# Patient Record
Sex: MALE | Race: WHITE | NOT HISPANIC OR LATINO | Employment: FULL TIME | URBAN - METROPOLITAN AREA
[De-identification: names, ages, dates, MRNs, and addresses within clinical notes are randomized per-mention and may not be internally consistent; named-entity substitution may affect disease eponyms.]

---

## 2017-12-18 ENCOUNTER — APPOINTMENT (OUTPATIENT)
Dept: RADIOLOGY | Facility: IMAGING CENTER | Age: 42
End: 2017-12-18
Attending: PHYSICIAN ASSISTANT
Payer: COMMERCIAL

## 2017-12-18 ENCOUNTER — OFFICE VISIT (OUTPATIENT)
Dept: URGENT CARE | Facility: CLINIC | Age: 42
End: 2017-12-18
Payer: COMMERCIAL

## 2017-12-18 VITALS
DIASTOLIC BLOOD PRESSURE: 78 MMHG | SYSTOLIC BLOOD PRESSURE: 120 MMHG | HEART RATE: 88 BPM | OXYGEN SATURATION: 95 % | HEIGHT: 69 IN | WEIGHT: 192 LBS | TEMPERATURE: 98 F | BODY MASS INDEX: 28.44 KG/M2 | RESPIRATION RATE: 20 BRPM

## 2017-12-18 DIAGNOSIS — R05.9 COUGH: ICD-10-CM

## 2017-12-18 DIAGNOSIS — J11.1 INFLUENZA: ICD-10-CM

## 2017-12-18 PROCEDURE — 71020 DX-CHEST-2 VIEWS: CPT | Mod: 26 | Performed by: PHYSICIAN ASSISTANT

## 2017-12-18 PROCEDURE — 99203 OFFICE O/P NEW LOW 30 MIN: CPT | Performed by: PHYSICIAN ASSISTANT

## 2017-12-18 RX ORDER — OSELTAMIVIR PHOSPHATE 75 MG/1
75 CAPSULE ORAL 2 TIMES DAILY
Qty: 10 CAP | Refills: 0 | Status: SHIPPED | OUTPATIENT
Start: 2017-12-18 | End: 2019-12-26

## 2017-12-18 ASSESSMENT — ENCOUNTER SYMPTOMS
WHEEZING: 0
SPUTUM PRODUCTION: 1
SORE THROAT: 0
HEADACHES: 1
VOMITING: 0
DIARRHEA: 0
COUGH: 1
CHILLS: 0
FOCAL WEAKNESS: 1
SENSORY CHANGE: 0
ABDOMINAL PAIN: 0
FEVER: 1
EYES NEGATIVE: 1
SHORTNESS OF BREATH: 0
SINUS PAIN: 0
MYALGIAS: 1
NAUSEA: 0

## 2017-12-18 NOTE — PROGRESS NOTES
"Subjective:      Neel Casillas is a 42 y.o. male who presents with Fever            Fever    This is a new problem. Episode onset: 2 days ago. The problem occurs constantly. The problem has been unchanged. The maximum temperature noted was 103 to 103.9 F. The temperature was taken using an oral thermometer. Associated symptoms include congestion, coughing, headaches and muscle aches. Pertinent negatives include no abdominal pain, chest pain, diarrhea, nausea, rash, sore throat, vomiting or wheezing. Treatments tried: OTC cold medication. The treatment provided mild relief.   Risk factors: recent sickness and sick contacts    Sick 2 weeks ago with cough/cold symptoms, started to feel better until 2 days ago he developed fever again up to 103.0 degrees with associated body aches, fatigue, cough, congestion, headache.  He did not get his influenza vaccine this year.      Review of Systems   Constitutional: Positive for fever and malaise/fatigue. Negative for chills.   HENT: Positive for congestion. Negative for sinus pain and sore throat.    Eyes: Negative.    Respiratory: Positive for cough and sputum production. Negative for shortness of breath and wheezing.    Cardiovascular: Negative for chest pain.   Gastrointestinal: Negative for abdominal pain, diarrhea, nausea and vomiting.   Musculoskeletal: Positive for myalgias. Negative for joint pain.   Skin: Negative for itching and rash.   Neurological: Positive for focal weakness and headaches. Negative for sensory change.          Objective:     /78   Pulse 88   Temp 36.7 °C (98 °F)   Resp 20   Ht 1.753 m (5' 9\")   Wt 87.1 kg (192 lb)   SpO2 95%   BMI 28.35 kg/m²      Physical Exam   Constitutional: He is oriented to person, place, and time. He appears well-developed and well-nourished.   HENT:   Head: Normocephalic and atraumatic.   Right Ear: External ear normal.   Left Ear: External ear normal.   Mouth/Throat: Oropharynx is clear and moist. No " oropharyngeal exudate.   Eyes: Conjunctivae and EOM are normal. Pupils are equal, round, and reactive to light.   Neck: Normal range of motion. Neck supple.   Cardiovascular: Normal rate, regular rhythm and normal heart sounds.    Pulmonary/Chest: Effort normal. He has wheezes. He has rales.   Left base wheezing/rhonchi   Musculoskeletal: Normal range of motion.   Lymphadenopathy:     He has no cervical adenopathy.   Neurological: He is alert and oriented to person, place, and time.   Skin: Skin is warm and dry.   Psychiatric: He has a normal mood and affect. His behavior is normal. Judgment and thought content normal.   Nursing note and vitals reviewed.         Chest XR:    FINDINGS:  The heart is normal in size.  No pulmonary infiltrates or consolidations are noted.  No pleural effusions are appreciated.     Impression       No pulmonary consolidation identified.          Assessment/Plan:     1. Influenza  - oseltamivir (TAMIFLU) 75 MG Cap; Take 1 Cap by mouth 2 times a day.  Dispense: 10 Cap; Refill: 0    2. Cough  - DX-CHEST-2 VIEWS; Future    Chest XR without evidence of infection.  Tamiflu as directed for influenza, rest, fluids, tylenol/motrin for discomfort.  Follow-up if symptoms change, get worse or new symptoms develop.

## 2018-03-22 ENCOUNTER — HOSPITAL ENCOUNTER (OUTPATIENT)
Dept: HOSPITAL 8 - CFH | Age: 43
Discharge: HOME | End: 2018-03-22
Attending: FAMILY MEDICINE
Payer: COMMERCIAL
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
## 2018-03-22 DIAGNOSIS — S22.080S: ICD-10-CM
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
## 2018-03-22 DIAGNOSIS — Z13.820: Primary | ICD-10-CM

## 2018-03-22 PROCEDURE — 77080 DXA BONE DENSITY AXIAL: CPT

## 2018-08-29 ENCOUNTER — HOSPITAL ENCOUNTER (OUTPATIENT)
Dept: HOSPITAL 8 - CFH | Age: 43
Discharge: HOME | End: 2018-08-29
Attending: INTERNAL MEDICINE
Payer: COMMERCIAL

## 2018-08-29 DIAGNOSIS — K29.70: ICD-10-CM

## 2018-08-29 DIAGNOSIS — R19.4: ICD-10-CM

## 2018-08-29 DIAGNOSIS — M43.06: ICD-10-CM

## 2018-08-29 DIAGNOSIS — K76.0: Primary | ICD-10-CM

## 2018-08-29 DIAGNOSIS — K80.20: ICD-10-CM

## 2018-08-29 PROCEDURE — 82565 ASSAY OF CREATININE: CPT

## 2018-08-29 PROCEDURE — 74177 CT ABD & PELVIS W/CONTRAST: CPT

## 2019-09-25 ENCOUNTER — TELEPHONE (OUTPATIENT)
Dept: SCHEDULING | Facility: IMAGING CENTER | Age: 44
End: 2019-09-25

## 2019-10-28 ENCOUNTER — APPOINTMENT (OUTPATIENT)
Dept: LAB | Facility: MEDICAL CENTER | Age: 44
End: 2019-10-28
Payer: COMMERCIAL

## 2019-10-29 ENCOUNTER — HOSPITAL ENCOUNTER (OUTPATIENT)
Dept: LAB | Facility: MEDICAL CENTER | Age: 44
End: 2019-10-29
Attending: INTERNAL MEDICINE
Payer: COMMERCIAL

## 2019-10-29 LAB
ALBUMIN SERPL BCP-MCNC: 4.2 G/DL (ref 3.2–4.9)
ALBUMIN/GLOB SERPL: 1.4 G/DL
ALP SERPL-CCNC: 50 U/L (ref 30–99)
ALT SERPL-CCNC: 11 U/L (ref 2–50)
ANION GAP SERPL CALC-SCNC: 6 MMOL/L (ref 0–11.9)
AST SERPL-CCNC: 21 U/L (ref 12–45)
BILIRUB SERPL-MCNC: 0.5 MG/DL (ref 0.1–1.5)
BUN SERPL-MCNC: 24 MG/DL (ref 8–22)
CALCIUM SERPL-MCNC: 9 MG/DL (ref 8.5–10.5)
CHLORIDE SERPL-SCNC: 104 MMOL/L (ref 96–112)
CHOLEST SERPL-MCNC: 237 MG/DL (ref 100–199)
CO2 SERPL-SCNC: 27 MMOL/L (ref 20–33)
CREAT SERPL-MCNC: 1.11 MG/DL (ref 0.5–1.4)
CREAT UR-MCNC: 146.1 MG/DL
FASTING STATUS PATIENT QL REPORTED: NORMAL
GLOBULIN SER CALC-MCNC: 3.1 G/DL (ref 1.9–3.5)
GLUCOSE SERPL-MCNC: 166 MG/DL (ref 65–99)
HDLC SERPL-MCNC: 77 MG/DL
LDLC SERPL CALC-MCNC: 145 MG/DL
MICROALBUMIN UR-MCNC: <0.7 MG/DL
MICROALBUMIN/CREAT UR: NORMAL MG/G (ref 0–30)
POTASSIUM SERPL-SCNC: 4.5 MMOL/L (ref 3.6–5.5)
PROT SERPL-MCNC: 7.3 G/DL (ref 6–8.2)
SODIUM SERPL-SCNC: 137 MMOL/L (ref 135–145)
TRIGL SERPL-MCNC: 76 MG/DL (ref 0–149)

## 2019-10-29 PROCEDURE — 80053 COMPREHEN METABOLIC PANEL: CPT

## 2019-10-29 PROCEDURE — 80061 LIPID PANEL: CPT

## 2019-10-29 PROCEDURE — 36415 COLL VENOUS BLD VENIPUNCTURE: CPT

## 2019-10-29 PROCEDURE — 82043 UR ALBUMIN QUANTITATIVE: CPT

## 2019-10-29 PROCEDURE — 82570 ASSAY OF URINE CREATININE: CPT

## 2019-10-29 PROCEDURE — 83036 HEMOGLOBIN GLYCOSYLATED A1C: CPT

## 2019-10-30 LAB
EST. AVERAGE GLUCOSE BLD GHB EST-MCNC: 157 MG/DL
HBA1C MFR BLD: 7.1 % (ref 0–5.6)

## 2019-12-26 ENCOUNTER — OFFICE VISIT (OUTPATIENT)
Dept: MEDICAL GROUP | Facility: MEDICAL CENTER | Age: 44
End: 2019-12-26
Payer: COMMERCIAL

## 2019-12-26 VITALS
OXYGEN SATURATION: 98 % | BODY MASS INDEX: 24.88 KG/M2 | HEART RATE: 70 BPM | TEMPERATURE: 97.6 F | HEIGHT: 69 IN | RESPIRATION RATE: 16 BRPM | DIASTOLIC BLOOD PRESSURE: 62 MMHG | SYSTOLIC BLOOD PRESSURE: 112 MMHG | WEIGHT: 168 LBS

## 2019-12-26 DIAGNOSIS — E11.3299 DIABETES MELLITUS WITH BACKGROUND RETINOPATHY (HCC): ICD-10-CM

## 2019-12-26 DIAGNOSIS — Z23 NEED FOR VACCINATION: ICD-10-CM

## 2019-12-26 DIAGNOSIS — N52.9 ERECTILE DYSFUNCTION, UNSPECIFIED ERECTILE DYSFUNCTION TYPE: ICD-10-CM

## 2019-12-26 DIAGNOSIS — N52.1 ERECTILE DISORDER DUE TO MEDICAL CONDITION IN MALE PATIENT: ICD-10-CM

## 2019-12-26 DIAGNOSIS — E10.69 DYSLIPIDEMIA DUE TO TYPE 1 DIABETES MELLITUS (HCC): ICD-10-CM

## 2019-12-26 DIAGNOSIS — I10 ESSENTIAL HYPERTENSION: ICD-10-CM

## 2019-12-26 DIAGNOSIS — Z00.00 ANNUAL PHYSICAL EXAM: ICD-10-CM

## 2019-12-26 DIAGNOSIS — E78.5 DYSLIPIDEMIA DUE TO TYPE 1 DIABETES MELLITUS (HCC): ICD-10-CM

## 2019-12-26 DIAGNOSIS — E10.3553 CONTROLLED TYPE 1 DIABETES MELLITUS WITH STABLE PROLIFERATIVE RETINOPATHY OF BOTH EYES (HCC): ICD-10-CM

## 2019-12-26 PROCEDURE — 90471 IMMUNIZATION ADMIN: CPT | Performed by: NURSE PRACTITIONER

## 2019-12-26 PROCEDURE — 99396 PREV VISIT EST AGE 40-64: CPT | Mod: 25 | Performed by: NURSE PRACTITIONER

## 2019-12-26 PROCEDURE — 90686 IIV4 VACC NO PRSV 0.5 ML IM: CPT | Performed by: NURSE PRACTITIONER

## 2019-12-26 RX ORDER — LEVOTHYROXINE SODIUM 0.15 MG/1
137 TABLET ORAL
COMMUNITY
End: 2022-10-21

## 2019-12-26 RX ORDER — SILDENAFIL CITRATE 20 MG/1
60-100 TABLET ORAL
Qty: 90 TAB | Refills: 3 | Status: SHIPPED | OUTPATIENT
Start: 2019-12-26 | End: 2023-01-04 | Stop reason: SDUPTHER

## 2019-12-26 ASSESSMENT — PATIENT HEALTH QUESTIONNAIRE - PHQ9: CLINICAL INTERPRETATION OF PHQ2 SCORE: 0

## 2019-12-26 NOTE — ASSESSMENT & PLAN NOTE
Diagnosed around 11 years old  Followed by endocrinology, Dr. Dotson. Last a1c 6.8  He has a continuous glucose monitor and insulin pump in place.  Following a healthy diet, exercising at least 5 days weekly.

## 2019-12-26 NOTE — PROGRESS NOTES
Chief Complaint   Patient presents with   • Establish Care   • Medication Refill     DIOGOELLIE     Neel Casillas is a 44 y.o. male here to establish care and for well exam.  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  No children.  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 Dotson. Last a1c 6.8  He has a continuous glucose monitor and insulin pump in place.  Following a healthy diet, exercising at least 5 days weekly.    Other postablative hypothyroidism  History of Graves' disease and ablation many years ago, now stable on liothyronine and levothyroxine.  Monitored by Dr. Dotson    Essential hypertension  Bp controlled on low dose of lisinopril.  He is exercising regularly.  No chest pain, dizziness, palpitation    Diabetes mellitus with background retinopathy  Patient reports consistent follow-up with ophthalmology, history of multiple eye surgeries    Erectile disorder due to medical condition in male patient  Patient reports that he has used sildenafil with good results and would like refill of this today.  No history of side effects related to medication use    Dyslipidemia due to type 1 diabetes mellitus (HCC)  Last lipid panel reviewed, .  He has not been prescribed a statin to date, reports that he and Dr. Dotson had planned to evaluate his next lipid panel before making treatment decision.  He is following a healthy diet    Current medicines (including changes today)  Current Outpatient Medications   Medication Sig Dispense Refill   • levothyroxine (SYNTHROID) 150 MCG Tab Take 150 mcg by mouth Every morning on an empty stomach.     • sildenafil (REVATIO) 20 MG tablet Take 3-5 Tabs by mouth 1 time daily as needed. 90 Tab 3   • lisinopril (PRINIVIL) 5 MG Tab TAKE 1 TAB BY MOUTH EVERY DAY. 90 Tab 3   • NOVOLOG 100 UNIT/ML SOLN USE AS DIRECTED PER MD NOT TO EXCEED 50 UNITS DAILY  "AS DIRECTED NEED APPOINTMENT FOR FURTHER REFILLS 20 mL 11   • liothyronine (CYTOMEL) 5 MCG TABS Take 10 mcg by mouth every day.       No current facility-administered medications for this visit.      He  has a past medical history of Diabetes, Unspecified disorder of thyroid, and Unspecified essential hypertension (6/17/2015). He also has no past medical history of ASTHMA.  He  has no past surgical history on file.  Social History     Tobacco Use   • Smoking status: Never Smoker   • Smokeless tobacco: Current User     Types: Snuff   Substance Use Topics   • Alcohol use: Yes     Comment: 4 per week   • Drug use: Never     Social History     Patient does not qualify to have social determinant information on file (likely too young).   Social History Narrative   • Not on file     Family History   Problem Relation Age of Onset   • Hypertension Father      Family Status   Relation Name Status   • Mo  Alive   • Fa  Alive         ROS  Problems listed discussed above, all other systems reviewed and negative     Objective:     /62 (BP Location: Left arm, Patient Position: Sitting, BP Cuff Size: Adult)   Pulse 70   Temp 36.4 °C (97.6 °F) (Temporal)   Resp 16   Ht 1.753 m (5' 9\")   Wt 76.2 kg (168 lb)   SpO2 98%  Body mass index is 24.81 kg/m².  Physical Exam:  General: Alert, oriented in no acute distress.  Eye contact is good, speech is normal, affect calm  HEENT: Oral mucosa pink moist, no lesions. Nares patent. TMs gray with good landmarks bilaterally. No cervical or supraclavicular lymphadenopathy, thyroid isthmus palpable without masses or nodules.  Lungs: clear to auscultation bilaterally, good aeration, normal effort. No wheeze/ rhonchi/ rales.  CV: regular rate and rhythm, S1, S2. No murmur, no JVD, no edema.  Pedal pulses 2 + bilaterally  Abdomen: soft, nontender, BS x4, no hepatosplenomegaly.  Ext: color normal, vascularity normal, temperature normal. No rash or lesions.  Neuro: DTR 2+ " bilaterally  Assessment and Plan:   The following treatment plan was discussed   1. Annual physical exam  Normal physical exam. General health and wellness discussion including healthy diet, regular exercise.  Advised regular dental cleanings, eye exam yearly.   2. Controlled type 1 diabetes mellitus with stable proliferative retinopathy of both eyes (HCC)   stable and doing well, followed by endocrinology   3. Essential hypertension   blood pressure controlled, continue lisinopril        4. Need for vaccination  I have placed the below orders and discussed them with an approved delegating provider. The MA is performing the below orders under the direction of Dr. Lam  Influenza Vaccine Quad Injection (PF)   5. Diabetes mellitus with background retinopathy (HCC)   continue close follow-up with ophthalmology   6. Erectile disorder due to medical condition in male patient   good results with sildenafil, refill provided.  Risks and side effects reviewed, advised caution with alcohol use  sildenafil (REVATIO) 20 MG tablet   7. Dyslipidemia due to type 1 diabetes mellitus (HCC)   lipid panel reviewed, benefits of statins discussed.  He will be following up with Dr. Dotson regarding this       Educated in proper administration of medication(s) ordered today including safety, possible SE, risks, benefits, rationale and alternatives to therapy.     Followup: Annually, sooner as needed             Please note that this dictation was created using voice recognition software. I have worked with consultants from the vendor as well as technical experts from [employer] to optimize the interface. I have made every reasonable attempt to correct obvious errors, but I expect that there are errors of grammar and possibly content that I did not discover before finalizing the note.

## 2019-12-26 NOTE — ASSESSMENT & PLAN NOTE
Last lipid panel reviewed, .  He has not been prescribed a statin to date, reports that he and Dr. Dotson had planned to evaluate his next lipid panel before making treatment decision.  He is following a healthy diet

## 2019-12-26 NOTE — ASSESSMENT & PLAN NOTE
Patient reports that he has used sildenafil with good results and would like refill of this today.  No history of side effects related to medication use

## 2019-12-26 NOTE — ASSESSMENT & PLAN NOTE
History of Graves' disease and ablation many years ago, now stable on liothyronine and levothyroxine.  Monitored by Dr. Dotson

## 2019-12-26 NOTE — ASSESSMENT & PLAN NOTE
Bp controlled on low dose of lisinopril.  He is exercising regularly.  No chest pain, dizziness, palpitation

## 2020-01-27 ENCOUNTER — PATIENT MESSAGE (OUTPATIENT)
Dept: MEDICAL GROUP | Facility: MEDICAL CENTER | Age: 45
End: 2020-01-27

## 2020-01-27 RX ORDER — POLYMYXIN B SULFATE AND TRIMETHOPRIM 1; 10000 MG/ML; [USP'U]/ML
1 SOLUTION OPHTHALMIC EVERY 4 HOURS
Qty: 10 ML | Refills: 0 | Status: SHIPPED | OUTPATIENT
Start: 2020-01-27

## 2020-02-26 ENCOUNTER — HOSPITAL ENCOUNTER (OUTPATIENT)
Dept: LAB | Facility: MEDICAL CENTER | Age: 45
End: 2020-02-26
Attending: INTERNAL MEDICINE
Payer: COMMERCIAL

## 2020-02-26 LAB
ALBUMIN SERPL BCP-MCNC: 4.3 G/DL (ref 3.2–4.9)
ALBUMIN/GLOB SERPL: 1.5 G/DL
ALP SERPL-CCNC: 45 U/L (ref 30–99)
ALT SERPL-CCNC: 9 U/L (ref 2–50)
ANION GAP SERPL CALC-SCNC: 6 MMOL/L (ref 0–11.9)
AST SERPL-CCNC: 20 U/L (ref 12–45)
BILIRUB SERPL-MCNC: 0.6 MG/DL (ref 0.1–1.5)
BUN SERPL-MCNC: 18 MG/DL (ref 8–22)
CALCIUM SERPL-MCNC: 9.2 MG/DL (ref 8.5–10.5)
CHLORIDE SERPL-SCNC: 104 MMOL/L (ref 96–112)
CHOLEST SERPL-MCNC: 177 MG/DL (ref 100–199)
CO2 SERPL-SCNC: 28 MMOL/L (ref 20–33)
CREAT SERPL-MCNC: 1.18 MG/DL (ref 0.5–1.4)
CREAT UR-MCNC: 209.9 MG/DL
EST. AVERAGE GLUCOSE BLD GHB EST-MCNC: 146 MG/DL
FASTING STATUS PATIENT QL REPORTED: NORMAL
GLOBULIN SER CALC-MCNC: 2.9 G/DL (ref 1.9–3.5)
GLUCOSE SERPL-MCNC: 154 MG/DL (ref 65–99)
HBA1C MFR BLD: 6.7 % (ref 0–5.6)
HDLC SERPL-MCNC: 76 MG/DL
LDLC SERPL CALC-MCNC: 92 MG/DL
MICROALBUMIN UR-MCNC: <0.7 MG/DL
MICROALBUMIN/CREAT UR: NORMAL MG/G (ref 0–30)
POTASSIUM SERPL-SCNC: 4.1 MMOL/L (ref 3.6–5.5)
PROT SERPL-MCNC: 7.2 G/DL (ref 6–8.2)
SODIUM SERPL-SCNC: 138 MMOL/L (ref 135–145)
TRIGL SERPL-MCNC: 47 MG/DL (ref 0–149)

## 2020-02-26 PROCEDURE — 36415 COLL VENOUS BLD VENIPUNCTURE: CPT

## 2020-02-26 PROCEDURE — 80061 LIPID PANEL: CPT

## 2020-02-26 PROCEDURE — 80053 COMPREHEN METABOLIC PANEL: CPT

## 2020-02-26 PROCEDURE — 82570 ASSAY OF URINE CREATININE: CPT

## 2020-02-26 PROCEDURE — 82043 UR ALBUMIN QUANTITATIVE: CPT

## 2020-02-26 PROCEDURE — 83036 HEMOGLOBIN GLYCOSYLATED A1C: CPT

## 2020-11-02 ENCOUNTER — PATIENT MESSAGE (OUTPATIENT)
Dept: MEDICAL GROUP | Facility: MEDICAL CENTER | Age: 45
End: 2020-11-02

## 2020-11-02 DIAGNOSIS — E11.3299 DIABETES MELLITUS WITH BACKGROUND RETINOPATHY (HCC): ICD-10-CM

## 2020-11-05 ENCOUNTER — OFFICE VISIT (OUTPATIENT)
Dept: MEDICAL GROUP | Facility: MEDICAL CENTER | Age: 45
End: 2020-11-05
Payer: COMMERCIAL

## 2020-11-05 ENCOUNTER — HOSPITAL ENCOUNTER (OUTPATIENT)
Dept: LAB | Facility: MEDICAL CENTER | Age: 45
End: 2020-11-05
Attending: INTERNAL MEDICINE
Payer: COMMERCIAL

## 2020-11-05 ENCOUNTER — PATIENT MESSAGE (OUTPATIENT)
Dept: MEDICAL GROUP | Facility: MEDICAL CENTER | Age: 45
End: 2020-11-05

## 2020-11-05 VITALS
HEART RATE: 75 BPM | DIASTOLIC BLOOD PRESSURE: 82 MMHG | HEIGHT: 68 IN | TEMPERATURE: 98.1 F | WEIGHT: 185.19 LBS | OXYGEN SATURATION: 96 % | SYSTOLIC BLOOD PRESSURE: 128 MMHG | BODY MASS INDEX: 28.07 KG/M2 | RESPIRATION RATE: 15 BRPM

## 2020-11-05 DIAGNOSIS — Z00.00 ANNUAL PHYSICAL EXAM: ICD-10-CM

## 2020-11-05 DIAGNOSIS — E11.3299 DIABETES MELLITUS WITH BACKGROUND RETINOPATHY (HCC): ICD-10-CM

## 2020-11-05 DIAGNOSIS — E10.311: ICD-10-CM

## 2020-11-05 DIAGNOSIS — Z23 NEED FOR VACCINATION: ICD-10-CM

## 2020-11-05 LAB
T4 FREE SERPL-MCNC: 1.58 NG/DL (ref 0.93–1.7)
TSH SERPL DL<=0.005 MIU/L-ACNC: 0.04 UIU/ML (ref 0.38–5.33)

## 2020-11-05 PROCEDURE — 90471 IMMUNIZATION ADMIN: CPT | Performed by: NURSE PRACTITIONER

## 2020-11-05 PROCEDURE — 84439 ASSAY OF FREE THYROXINE: CPT

## 2020-11-05 PROCEDURE — 36415 COLL VENOUS BLD VENIPUNCTURE: CPT

## 2020-11-05 PROCEDURE — 90686 IIV4 VACC NO PRSV 0.5 ML IM: CPT | Performed by: NURSE PRACTITIONER

## 2020-11-05 PROCEDURE — 84443 ASSAY THYROID STIM HORMONE: CPT

## 2020-11-05 PROCEDURE — 99396 PREV VISIT EST AGE 40-64: CPT | Mod: 25 | Performed by: NURSE PRACTITIONER

## 2020-11-05 PROCEDURE — 83036 HEMOGLOBIN GLYCOSYLATED A1C: CPT

## 2020-11-05 NOTE — TELEPHONE ENCOUNTER
From: Neel Casillas  To: NENA Guzman  Sent: 11/5/2020 2:32 PM PST  Subject: Test Result Question    Yes please. Thank you.       ----- Message -----   From:NENA Guzman   Sent:11/5/2020 1:49 PM PST   To:Neel Casillas   Subject:RE: Test Result Question    I would recommend adding on a lipid panel and comprehensive metabolic panel. That would mean that you have to fast, do you want me to enter that in the system?      ----- Message -----   From:Neel Casillas   Sent:11/5/2020 11:24 AM PST   To:NENA Guzman   Subject:Test Result Question    Hi,  So I just see the endo things on here.   If you think I should get more tests please let me know.     And fabulous seeing you today.     Thanks     Isaiah

## 2020-11-05 NOTE — ASSESSMENT & PLAN NOTE
Well controlled with last A1c of 6.7, followed by endocrinology.  He will be having labs again today

## 2020-11-05 NOTE — PROGRESS NOTES
Chief Complaint   Patient presents with   • Flu Vaccine   • Medication Refill     Neel Casillas is a 45 y.o. male here for annual exam    Diabetes mellitus with background retinopathy  Continues to be monitored by oph, appt today. Having more difficulty with the L eye currently, cloudy vision    Other postablative hypothyroidism  Feeling well on levothyroxine 150 mcg and cytomel 5 mcg daily.  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  He will be having labs again today    Current medicines (including changes today)  Current Outpatient Medications   Medication Sig Dispense Refill   • polymixin-trimethoprim (POLYTRIM) 70442-5.1 UNIT/ML-% Solution Place 1 Drop in both eyes every 4 hours. 10 mL 0   • levothyroxine (SYNTHROID) 150 MCG Tab Take 150 mcg by mouth Every morning on an empty stomach.     • sildenafil (REVATIO) 20 MG tablet Take 3-5 Tabs by mouth 1 time daily as needed. 90 Tab 3   • lisinopril (PRINIVIL) 5 MG Tab TAKE 1 TAB BY MOUTH EVERY DAY. 90 Tab 3   • NOVOLOG 100 UNIT/ML SOLN USE AS DIRECTED PER MD NOT TO EXCEED 50 UNITS DAILY AS DIRECTED NEED APPOINTMENT FOR FURTHER REFILLS 20 mL 11   • liothyronine (CYTOMEL) 5 MCG TABS Take 10 mcg by mouth every day.       No current facility-administered medications for this visit.      He  has a past medical history of Diabetes, Unspecified disorder of thyroid, and Unspecified essential hypertension (6/17/2015). He also has no past medical history of ASTHMA.  He  has no past surgical history on file.  Social History     Tobacco Use   • Smoking status: Never Smoker   • Smokeless tobacco: Current User     Types: Snuff   Substance Use Topics   • Alcohol use: Yes     Comment: 4 per week   • Drug use: Never     Social History     Social History Narrative   • Not on file     Family History   Problem Relation Age of Onset   • Hypertension Father   "    Family Status   Relation Name Status   • Mo  Alive   • Fa  Alive         ROS  Problems listed discussed above, all other systems reviewed and negative     Objective:     /82 (BP Location: Left arm, Patient Position: Sitting, BP Cuff Size: Adult)   Pulse 75   Temp 36.7 °C (98.1 °F) (Temporal)   Resp 15   Ht 1.727 m (5' 8\")   Wt 84 kg (185 lb 3 oz)   SpO2 96%  Body mass index is 28.16 kg/m².  Physical Exam:  General: Alert, oriented in no acute distress.  Eye contact is good, speech is normal, affect calm  HEENT: TMs gray with good landmarks bilaterally. No cervical or supraclavicular lymphadenopathy, thyroid isthmus palpable without masses or nodules.  Lungs: clear to auscultation bilaterally, good aeration, normal effort. No wheeze/ rhonchi/ rales.  CV: regular rate and rhythm, S1, S2. No murmur, no JVD, no edema. Pedal pulses 2 + bilaterally  Abdomen: soft, nontender, BS x4, no hepatosplenomegaly.  Ext: color normal, vascularity normal, temperature normal. No rash or lesions.    Assessment and Plan:   The following treatment plan was discussed  1. Annual physical exam  Normal physical exam. General health and wellness discussion including healthy diet, regular exercise. 2000 iu Vit d3 advised daily. Preventative health screenings up to date. Advised regular dental cleanings, eye exam yearly.     2. Diabetes mellitus with background retinopathy (HCC)   followed by ophthalmology, more difficulty with vision in the left eye at this time.  He has an appointment later today.  I will follow along with consultation notes   3. Need for vaccination  I have placed the below orders and discussed them with an approved delegating provider. The MA is performing the below orders under the direction of Dr. Flores  Influenza Vaccine Quad Injection (PF)   4. Controlled type 1 diabetes mellitus with left eye affected by retinopathy and macular edema, unspecified retinopathy severity (HCC)   controlled, followed by " endocrinology.  Lab orders have been taken care of by his specialist         Followup: annually             Please note that this dictation was created using voice recognition software. I have worked with consultants from the vendor as well as technical experts from [facility] [employer] to optimize the interface. I have made every reasonable attempt to correct obvious errors, but I expect that there are errors of grammar and possibly content that I did not discover before finalizing the note.

## 2020-11-06 LAB
EST. AVERAGE GLUCOSE BLD GHB EST-MCNC: 143 MG/DL
HBA1C MFR BLD: 6.6 % (ref 0–5.6)

## 2021-03-31 ENCOUNTER — IMMUNIZATION (OUTPATIENT)
Dept: FAMILY PLANNING/WOMEN'S HEALTH CLINIC | Facility: IMMUNIZATION CENTER | Age: 46
End: 2021-03-31
Payer: COMMERCIAL

## 2021-03-31 DIAGNOSIS — Z23 ENCOUNTER FOR VACCINATION: Primary | ICD-10-CM

## 2021-03-31 PROCEDURE — 91300 PFIZER SARS-COV-2 VACCINE: CPT

## 2021-03-31 PROCEDURE — 0001A PFIZER SARS-COV-2 VACCINE: CPT

## 2021-04-23 ENCOUNTER — IMMUNIZATION (OUTPATIENT)
Dept: FAMILY PLANNING/WOMEN'S HEALTH CLINIC | Facility: IMMUNIZATION CENTER | Age: 46
End: 2021-04-23
Payer: COMMERCIAL

## 2021-04-23 DIAGNOSIS — Z23 ENCOUNTER FOR VACCINATION: Primary | ICD-10-CM

## 2021-04-23 PROCEDURE — 91300 PFIZER SARS-COV-2 VACCINE: CPT

## 2021-04-23 PROCEDURE — 0002A PFIZER SARS-COV-2 VACCINE: CPT

## 2021-07-29 ENCOUNTER — HOSPITAL ENCOUNTER (OUTPATIENT)
Facility: MEDICAL CENTER | Age: 46
End: 2021-07-29
Attending: NURSE PRACTITIONER
Payer: COMMERCIAL

## 2021-07-29 ENCOUNTER — OFFICE VISIT (OUTPATIENT)
Dept: URGENT CARE | Facility: CLINIC | Age: 46
End: 2021-07-29
Payer: COMMERCIAL

## 2021-07-29 VITALS
HEIGHT: 68 IN | TEMPERATURE: 98.2 F | OXYGEN SATURATION: 99 % | HEART RATE: 72 BPM | RESPIRATION RATE: 16 BRPM | BODY MASS INDEX: 25.01 KG/M2 | DIASTOLIC BLOOD PRESSURE: 54 MMHG | WEIGHT: 165 LBS | SYSTOLIC BLOOD PRESSURE: 120 MMHG

## 2021-07-29 DIAGNOSIS — R10.31 RLQ ABDOMINAL PAIN: ICD-10-CM

## 2021-07-29 LAB
APPEARANCE UR: CLEAR
BILIRUB UR STRIP-MCNC: NEGATIVE MG/DL
COLOR UR AUTO: YELLOW
GLUCOSE UR STRIP.AUTO-MCNC: 100 MG/DL
KETONES UR STRIP.AUTO-MCNC: NEGATIVE MG/DL
LEUKOCYTE ESTERASE UR QL STRIP.AUTO: NEGATIVE
NITRITE UR QL STRIP.AUTO: NEGATIVE
PH UR STRIP.AUTO: 6 [PH] (ref 5–8)
PROT UR QL STRIP: NEGATIVE MG/DL
RBC UR QL AUTO: NEGATIVE
SP GR UR STRIP.AUTO: 1.02
UROBILINOGEN UR STRIP-MCNC: 0.2 MG/DL

## 2021-07-29 PROCEDURE — 80053 COMPREHEN METABOLIC PANEL: CPT

## 2021-07-29 PROCEDURE — 85025 COMPLETE CBC W/AUTO DIFF WBC: CPT

## 2021-07-29 PROCEDURE — 99214 OFFICE O/P EST MOD 30 MIN: CPT | Performed by: NURSE PRACTITIONER

## 2021-07-29 PROCEDURE — 81002 URINALYSIS NONAUTO W/O SCOPE: CPT | Performed by: NURSE PRACTITIONER

## 2021-07-29 ASSESSMENT — ENCOUNTER SYMPTOMS: ABDOMINAL PAIN: 1

## 2021-07-29 NOTE — PROGRESS NOTES
Subjective:      Neel Casillas is a 46 y.o. male who presents with RLQ Pain (today )    Past Medical History:   Diagnosis Date   • Diabetes    • Unspecified disorder of thyroid    • Unspecified essential hypertension 6/17/2015     Social History     Socioeconomic History   • Marital status:      Spouse name: Not on file   • Number of children: Not on file   • Years of education: Not on file   • Highest education level: Not on file   Occupational History   • Not on file   Tobacco Use   • Smoking status: Never Smoker   • Smokeless tobacco: Current User     Types: Snuff   Vaping Use   • Vaping Use: Never used   Substance and Sexual Activity   • Alcohol use: Yes     Comment: 4 per week   • Drug use: Never   • Sexual activity: Not on file   Other Topics Concern   • Not on file   Social History Narrative   • Not on file     Social Determinants of Health     Financial Resource Strain:    • Difficulty of Paying Living Expenses:    Food Insecurity:    • Worried About Running Out of Food in the Last Year:    • Ran Out of Food in the Last Year:    Transportation Needs:    • Lack of Transportation (Medical):    • Lack of Transportation (Non-Medical):    Physical Activity:    • Days of Exercise per Week:    • Minutes of Exercise per Session:    Stress:    • Feeling of Stress :    Social Connections:    • Frequency of Communication with Friends and Family:    • Frequency of Social Gatherings with Friends and Family:    • Attends Baptism Services:    • Active Member of Clubs or Organizations:    • Attends Club or Organization Meetings:    • Marital Status:    Intimate Partner Violence:    • Fear of Current or Ex-Partner:    • Emotionally Abused:    • Physically Abused:    • Sexually Abused:      Family History   Problem Relation Age of Onset   • Hypertension Father        Allergies: Patient has no known allergies.    Patient is 46-year-old male who presents today with complaint of right lower quadrant abdominal  "pain.  Patient states he has been having chronic abdominal pain intermittently for the last 18 months.  Has not discussed this with his primary care physician.  Patient states he experienced an acute episode of right lower quadrant pain earlier today.  He states this is waned but still has some sensitivity in this area.  No nausea or vomiting.  Patient has a history of insulin-dependent diabetes and is currently on an insulin pump.  I do not have any recent labs in the patient's chart.          RLQ Pain  This is a new problem. The current episode started today. The onset quality is sudden. The problem occurs constantly. The pain is mild. Nothing aggravates the pain. The pain is relieved by nothing.       Review of Systems   Gastrointestinal: Positive for abdominal pain.   All other systems reviewed and are negative.         Objective:     /54   Pulse 72   Temp 36.8 °C (98.2 °F) (Temporal)   Resp 16   Ht 1.727 m (5' 8\")   Wt 74.8 kg (165 lb)   SpO2 99%   BMI 25.09 kg/m²      Physical Exam  Vitals reviewed.   Constitutional:       Appearance: Normal appearance.   Abdominal:      General: Abdomen is flat.      Palpations: Abdomen is soft.      Tenderness: There is abdominal tenderness. There is no right CVA tenderness, left CVA tenderness, guarding or rebound.      Comments: There is tenderness over the right lower quadrant; no guarding or rebound tenderness.  Abdomen is soft and nondistended.   Musculoskeletal:         General: Normal range of motion.   Skin:     General: Skin is warm.   Neurological:      General: No focal deficit present.      Mental Status: He is alert and oriented to person, place, and time.   Psychiatric:         Mood and Affect: Mood normal.         Behavior: Behavior normal.         Thought Content: Thought content normal.         Judgment: Judgment normal.       UA: negative blood, negative leukocytes, negative nitrates        I did discuss with patient at length that as his " symptoms seem to be chronic and intermittent that imaging may not indicate what the underlying cause is.  I advised patient that the purpose of doing CT would be to rule out an emergent cause of his symptoms.  Patient verbalized understanding and agreement.  I did also advise patient that because of his history of diabetes that I do need to check renal function prior to ordering a CT and that we would not be able to get the CT done today because of the time currently.  Patient verbalized understanding.  Patient was given option to go to the emergency room and declines and wants to have this done on an outpatient basis.            Assessment/Plan:   Right lower quadrant abdominal pain    Labs ordered including CBC and CMP  CT of the abdomen and pelvis ordered for tomorrow  Strict ER precautions for increasing pain tonight  Will consider referral to GI or back to patient's primary care physician based on outcome of the CT.     There are no diagnoses linked to this encounter.

## 2021-07-30 ENCOUNTER — TELEPHONE (OUTPATIENT)
Dept: URGENT CARE | Facility: CLINIC | Age: 46
End: 2021-07-30

## 2021-07-30 ENCOUNTER — HOSPITAL ENCOUNTER (OUTPATIENT)
Dept: RADIOLOGY | Facility: MEDICAL CENTER | Age: 46
End: 2021-07-30
Attending: NURSE PRACTITIONER
Payer: COMMERCIAL

## 2021-07-30 DIAGNOSIS — R10.31 RLQ ABDOMINAL PAIN: ICD-10-CM

## 2021-07-30 DIAGNOSIS — R10.84 GENERALIZED ABDOMINAL PAIN: ICD-10-CM

## 2021-07-30 LAB
ALBUMIN SERPL BCP-MCNC: 4.2 G/DL (ref 3.2–4.9)
ALBUMIN/GLOB SERPL: 1.6 G/DL
ALP SERPL-CCNC: 50 U/L (ref 30–99)
ALT SERPL-CCNC: <5 U/L (ref 2–50)
ANION GAP SERPL CALC-SCNC: 11 MMOL/L (ref 7–16)
AST SERPL-CCNC: 19 U/L (ref 12–45)
BASOPHILS # BLD AUTO: 0.6 % (ref 0–1.8)
BASOPHILS # BLD: 0.06 K/UL (ref 0–0.12)
BILIRUB SERPL-MCNC: 0.4 MG/DL (ref 0.1–1.5)
BUN SERPL-MCNC: 17 MG/DL (ref 8–22)
CALCIUM SERPL-MCNC: 9 MG/DL (ref 8.5–10.5)
CHLORIDE SERPL-SCNC: 96 MMOL/L (ref 96–112)
CO2 SERPL-SCNC: 26 MMOL/L (ref 20–33)
CREAT SERPL-MCNC: 1.05 MG/DL (ref 0.5–1.4)
EOSINOPHIL # BLD AUTO: 0.09 K/UL (ref 0–0.51)
EOSINOPHIL NFR BLD: 0.9 % (ref 0–6.9)
ERYTHROCYTE [DISTWIDTH] IN BLOOD BY AUTOMATED COUNT: 45.8 FL (ref 35.9–50)
GLOBULIN SER CALC-MCNC: 2.7 G/DL (ref 1.9–3.5)
GLUCOSE SERPL-MCNC: 236 MG/DL (ref 65–99)
HCT VFR BLD AUTO: 43 % (ref 42–52)
HGB BLD-MCNC: 14.1 G/DL (ref 14–18)
IMM GRANULOCYTES # BLD AUTO: 0.04 K/UL (ref 0–0.11)
IMM GRANULOCYTES NFR BLD AUTO: 0.4 % (ref 0–0.9)
LYMPHOCYTES # BLD AUTO: 1.38 K/UL (ref 1–4.8)
LYMPHOCYTES NFR BLD: 13.4 % (ref 22–41)
MCH RBC QN AUTO: 32.3 PG (ref 27–33)
MCHC RBC AUTO-ENTMCNC: 32.8 G/DL (ref 33.7–35.3)
MCV RBC AUTO: 98.4 FL (ref 81.4–97.8)
MONOCYTES # BLD AUTO: 0.79 K/UL (ref 0–0.85)
MONOCYTES NFR BLD AUTO: 7.7 % (ref 0–13.4)
NEUTROPHILS # BLD AUTO: 7.95 K/UL (ref 1.82–7.42)
NEUTROPHILS NFR BLD: 77 % (ref 44–72)
NRBC # BLD AUTO: 0 K/UL
NRBC BLD-RTO: 0 /100 WBC
PLATELET # BLD AUTO: 198 K/UL (ref 164–446)
PMV BLD AUTO: 9.9 FL (ref 9–12.9)
POTASSIUM SERPL-SCNC: 4.7 MMOL/L (ref 3.6–5.5)
PROT SERPL-MCNC: 6.9 G/DL (ref 6–8.2)
RBC # BLD AUTO: 4.37 M/UL (ref 4.7–6.1)
SODIUM SERPL-SCNC: 133 MMOL/L (ref 135–145)
WBC # BLD AUTO: 10.3 K/UL (ref 4.8–10.8)

## 2021-07-30 PROCEDURE — 74177 CT ABD & PELVIS W/CONTRAST: CPT

## 2021-07-30 PROCEDURE — 700117 HCHG RX CONTRAST REV CODE 255: Performed by: NURSE PRACTITIONER

## 2021-07-30 RX ADMIN — IOHEXOL 100 ML: 350 INJECTION, SOLUTION INTRAVENOUS at 10:24

## 2021-07-30 RX ADMIN — IOHEXOL 25 ML: 240 INJECTION, SOLUTION INTRATHECAL; INTRAVASCULAR; INTRAVENOUS; ORAL at 10:45

## 2021-07-30 NOTE — TELEPHONE ENCOUNTER
Patient notified by phone of results of CT and labs.  White blood cell count 10.3.  Liver and kidney function studies are within normal limits.  CT of the abdomen and pelvis does show gallstones, otherwise no other abnormalities were noted.  We will refer patient to gastroenterology for consult at this time, with the understanding that patient will follow up in the ER for acute worsening of symptoms or increasing pain.  Patient verbalized understanding and agreement with plan of care.  No further questions at this time.  Patient also advised to call or return to urgent care otherwise for any further questions or concerns

## 2022-01-08 ENCOUNTER — OFFICE VISIT (OUTPATIENT)
Dept: URGENT CARE | Facility: CLINIC | Age: 47
End: 2022-01-08
Payer: COMMERCIAL

## 2022-01-08 ENCOUNTER — APPOINTMENT (OUTPATIENT)
Dept: RADIOLOGY | Facility: IMAGING CENTER | Age: 47
End: 2022-01-08
Attending: PHYSICIAN ASSISTANT
Payer: COMMERCIAL

## 2022-01-08 VITALS
WEIGHT: 167 LBS | BODY MASS INDEX: 24.73 KG/M2 | SYSTOLIC BLOOD PRESSURE: 116 MMHG | OXYGEN SATURATION: 98 % | DIASTOLIC BLOOD PRESSURE: 64 MMHG | HEART RATE: 88 BPM | RESPIRATION RATE: 18 BRPM | HEIGHT: 69 IN | TEMPERATURE: 98.8 F

## 2022-01-08 DIAGNOSIS — S93.402A SPRAIN OF LEFT ANKLE, UNSPECIFIED LIGAMENT, INITIAL ENCOUNTER: ICD-10-CM

## 2022-01-08 DIAGNOSIS — S99.912A INJURY OF LEFT ANKLE, INITIAL ENCOUNTER: ICD-10-CM

## 2022-01-08 PROCEDURE — 99214 OFFICE O/P EST MOD 30 MIN: CPT | Performed by: PHYSICIAN ASSISTANT

## 2022-01-08 PROCEDURE — 73610 X-RAY EXAM OF ANKLE: CPT | Mod: TC,FY,LT | Performed by: PHYSICIAN ASSISTANT

## 2022-01-08 ASSESSMENT — ENCOUNTER SYMPTOMS
CHILLS: 0
TINGLING: 0
NAUSEA: 0
FALLS: 1
SENSORY CHANGE: 0
VOMITING: 0
FEVER: 0

## 2022-01-08 ASSESSMENT — FIBROSIS 4 INDEX: FIB4 SCORE: 2.08

## 2022-01-09 NOTE — PROGRESS NOTES
Subjective     Neel Casillas is a 46 y.o. male who presents with Ankle Injury (left ankle, skiing injury that happen time.)    HPI:  Neel Casillas is a 46 y.o. male who presents today for evaluation of an injury to his left ankle.  Patient was skiing earlier today when somebody cut in front of them causing him to fall at speed.  He was wearing tall boots at the time but still managed to twist his left ankle when he went down and he has been having pain and swelling in his left ankle since that time.  He denies any previous injury.  No numbness/tingling.  Pain is worse with ambulation.      Review of Systems   Constitutional: Negative for chills and fever.   Gastrointestinal: Negative for nausea and vomiting.   Musculoskeletal: Positive for falls and joint pain (left ankle injury).   Neurological: Negative for tingling and sensory change.       PMH:  has a past medical history of Diabetes, Unspecified disorder of thyroid, and Unspecified essential hypertension (6/17/2015). He also has no past medical history of ASTHMA.  MEDS:   Current Outpatient Medications:   •  polymixin-trimethoprim (POLYTRIM) 00538-6.1 UNIT/ML-% Solution, Place 1 Drop in both eyes every 4 hours., Disp: 10 mL, Rfl: 0  •  levothyroxine (SYNTHROID) 150 MCG Tab, Take 150 mcg by mouth Every morning on an empty stomach., Disp: , Rfl:   •  sildenafil (REVATIO) 20 MG tablet, Take 3-5 Tabs by mouth 1 time daily as needed., Disp: 90 Tab, Rfl: 3  •  lisinopril (PRINIVIL) 5 MG Tab, TAKE 1 TAB BY MOUTH EVERY DAY., Disp: 90 Tab, Rfl: 3  •  NOVOLOG 100 UNIT/ML SOLN, USE AS DIRECTED PER MD NOT TO EXCEED 50 UNITS DAILY AS DIRECTED NEED APPOINTMENT FOR FURTHER REFILLS, Disp: 20 mL, Rfl: 11  •  liothyronine (CYTOMEL) 5 MCG TABS, Take 10 mcg by mouth every day., Disp: , Rfl:   ALLERGIES: No Known Allergies  SURGHX: History reviewed. No pertinent surgical history.  SOCHX:  reports that he has never smoked. His smokeless tobacco use includes snuff. He reports  "current alcohol use. He reports that he does not use drugs.  FH: Family history was reviewed, no pertinent findings to report      Objective     /64 (BP Location: Right arm, Patient Position: Sitting, BP Cuff Size: Adult)   Pulse 88   Temp 37.1 °C (98.8 °F) (Temporal)   Resp 18   Ht 1.74 m (5' 8.5\")   Wt 75.8 kg (167 lb)   SpO2 98%   BMI 25.02 kg/m²      Physical Exam  Constitutional:       Appearance: He is well-developed.   HENT:      Head: Normocephalic and atraumatic.      Right Ear: External ear normal.      Left Ear: External ear normal.   Eyes:      Conjunctiva/sclera: Conjunctivae normal.      Pupils: Pupils are equal, round, and reactive to light.   Cardiovascular:      Rate and Rhythm: Normal rate and regular rhythm.      Heart sounds: Normal heart sounds. No murmur heard.      Pulmonary:      Effort: Pulmonary effort is normal.      Breath sounds: Normal breath sounds. No wheezing.   Musculoskeletal:      Left ankle: Swelling (Most notable on lateral aspect of left ankle) present. No deformity or ecchymosis. Tenderness present over the lateral malleolus and ATF ligament. No medial malleolus, base of 5th metatarsal or proximal fibula tenderness. Decreased range of motion. Anterior drawer test negative. Normal pulse.      Left Achilles Tendon: Normal.      Comments: DP pulses are 2+ and symmetric bilaterally.  Distal neurovascular intact.  Cap refill of all toes is less than 2 seconds.   Skin:     General: Skin is warm and dry.      Capillary Refill: Capillary refill takes less than 2 seconds.   Neurological:      Mental Status: He is alert and oriented to person, place, and time.   Psychiatric:         Behavior: Behavior normal.         Judgment: Judgment normal.       DX-ANKLE 3+ VIEWS LEFT  IMPRESSION:  1.  No evidence of fracture or dislocation.  2.  Mild soft tissue swelling overlying the lateral ankle.      *X-rays were reviewed and interpreted independently by me. I agree with the " radiologist's findings     Assessment & Plan     1. Sprain of left ankle, unspecified ligament, initial encounter  - DX-ANKLE 3+ VIEWS LEFT; Future  -Aircast ankle stirrup was applied and crutches provided.  He was advised to be nonweightbearing for the next 48 hours and then weightbearing as tolerated.  - Apply ice multiple times per day  - OTC NSAIDs  - Keep elevated as much as possible  - Follow up with PCP if no improvement after a few days          My total time spent caring for the patient on the day of the encounter was 30 minutes.   This does not include time spent on separately billable procedures/tests.        Differential Diagnosis, natural history, and supportive care discussed. Return to the Urgent Care or follow up with your PCP if symptoms fail to resolve, or for any new or worsening symptoms. Emergency room precautions discussed. Patient and/or family appears understanding of information.

## 2022-03-10 ENCOUNTER — OFFICE VISIT (OUTPATIENT)
Dept: MEDICAL GROUP | Facility: MEDICAL CENTER | Age: 47
End: 2022-03-10
Payer: COMMERCIAL

## 2022-03-10 VITALS
HEART RATE: 89 BPM | TEMPERATURE: 97 F | OXYGEN SATURATION: 99 % | HEIGHT: 68 IN | BODY MASS INDEX: 25.01 KG/M2 | WEIGHT: 165 LBS | DIASTOLIC BLOOD PRESSURE: 62 MMHG | SYSTOLIC BLOOD PRESSURE: 110 MMHG

## 2022-03-10 DIAGNOSIS — M75.02 ADHESIVE CAPSULITIS OF BOTH SHOULDERS: ICD-10-CM

## 2022-03-10 DIAGNOSIS — M25.512 CHRONIC PAIN OF BOTH SHOULDERS: ICD-10-CM

## 2022-03-10 DIAGNOSIS — Z00.00 HEALTH CARE MAINTENANCE: ICD-10-CM

## 2022-03-10 DIAGNOSIS — M75.01 ADHESIVE CAPSULITIS OF BOTH SHOULDERS: ICD-10-CM

## 2022-03-10 DIAGNOSIS — G89.29 CHRONIC PAIN OF BOTH SHOULDERS: ICD-10-CM

## 2022-03-10 DIAGNOSIS — E10.311: ICD-10-CM

## 2022-03-10 DIAGNOSIS — M25.511 CHRONIC PAIN OF BOTH SHOULDERS: ICD-10-CM

## 2022-03-10 PROBLEM — M75.00 FROZEN SHOULDER: Status: ACTIVE | Noted: 2022-03-10

## 2022-03-10 PROCEDURE — 99204 OFFICE O/P NEW MOD 45 MIN: CPT | Performed by: STUDENT IN AN ORGANIZED HEALTH CARE EDUCATION/TRAINING PROGRAM

## 2022-03-10 ASSESSMENT — ENCOUNTER SYMPTOMS
FEVER: 0
SHORTNESS OF BREATH: 0
CHILLS: 0

## 2022-03-10 ASSESSMENT — FIBROSIS 4 INDEX: FIB4 SCORE: 2.08

## 2022-03-10 ASSESSMENT — PATIENT HEALTH QUESTIONNAIRE - PHQ9: CLINICAL INTERPRETATION OF PHQ2 SCORE: 0

## 2022-03-10 NOTE — PROGRESS NOTES
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 10 mL 0   • levothyroxine (SYNTHROID) 150 MCG Tab Take 150 mcg by mouth Every morning on an empty stomach.     • sildenafil (REVATIO) 20 MG tablet Take 3-5 Tabs by mouth 1 time daily as needed. 90 Tab 3   • lisinopril (PRINIVIL) 5 MG Tab TAKE 1 TAB BY MOUTH EVERY DAY. 90 Tab 3   • NOVOLOG 100 UNIT/ML SOLN USE AS DIRECTED PER MD NOT TO EXCEED 50 UNITS DAILY AS DIRECTED NEED APPOINTMENT FOR FURTHER REFILLS 20 mL 11   • liothyronine (CYTOMEL) 5 MCG TABS Take 10 mcg by mouth every day.       No current [facility]-ordered facility-administered medications on file.           ROS:  Review of Systems   Constitutional: Negative for chills and fever.   Respiratory: Negative for shortness of breath.    Cardiovascular: Negative for chest pain.   Musculoskeletal: Positive for joint pain.       Objective:     Exam:  /62 (BP Location: Left arm, Patient Position: Sitting, BP Cuff Size: Adult)   Pulse 89   Temp 36.1 °C (97 °F) (Temporal)   Ht 1.727 m (5' 8\")   Wt 74.8 kg (165 lb)   SpO2 99%   BMI 25.09 kg/m²  Body mass index is 25.09 kg/m².    Physical Exam  Constitutional:       Appearance: Normal appearance.   Pulmonary:      Effort: Pulmonary effort is normal. "   Musculoskeletal:      Comments: ROM decreased b/l in both shoulders. stiff   Neurological:      Mental Status: He is alert.               Assessment & Plan:     Problem List Items Addressed This Visit     Controlled type 1 diabetes mellitus with ophthalmic complication (HCC)     -Monofilament test completed yesterday by endocrinology           Relevant Orders    Diabetic Monofilament LE Exam (Completed)    Frozen shoulder     Chronic-worsening  -refer to ortho  -PT order placed            Other Visit Diagnoses     Health care maintenance        Relevant Orders    HEMOGLOBIN A1C    CBC WITH DIFFERENTIAL    Comp Metabolic Panel    TSH WITH REFLEX TO FT4    Lipid Profile              Return in about 1 month (around 4/10/2022) for Annual.    Please note that this dictation was created using voice recognition software. I have made every reasonable attempt to correct obvious errors, but I expect that there are errors of grammar and possibly content that I did not discover before finalizing the note.

## 2022-03-11 ENCOUNTER — HOSPITAL ENCOUNTER (OUTPATIENT)
Dept: RADIOLOGY | Facility: MEDICAL CENTER | Age: 47
End: 2022-03-11
Attending: ORTHOPAEDIC SURGERY
Payer: COMMERCIAL

## 2022-03-11 DIAGNOSIS — M25.512 ACUTE PAIN OF LEFT SHOULDER: ICD-10-CM

## 2022-03-11 PROCEDURE — 73030 X-RAY EXAM OF SHOULDER: CPT | Mod: LT

## 2022-04-12 ENCOUNTER — HOSPITAL ENCOUNTER (OUTPATIENT)
Dept: LAB | Facility: MEDICAL CENTER | Age: 47
End: 2022-04-12
Attending: STUDENT IN AN ORGANIZED HEALTH CARE EDUCATION/TRAINING PROGRAM
Payer: COMMERCIAL

## 2022-04-12 ENCOUNTER — APPOINTMENT (OUTPATIENT)
Dept: MEDICAL GROUP | Facility: MEDICAL CENTER | Age: 47
End: 2022-04-12
Payer: COMMERCIAL

## 2022-04-12 DIAGNOSIS — Z00.00 HEALTH CARE MAINTENANCE: ICD-10-CM

## 2022-04-13 ENCOUNTER — HOSPITAL ENCOUNTER (OUTPATIENT)
Dept: LAB | Facility: MEDICAL CENTER | Age: 47
End: 2022-04-13
Attending: STUDENT IN AN ORGANIZED HEALTH CARE EDUCATION/TRAINING PROGRAM
Payer: COMMERCIAL

## 2022-04-13 LAB
ALBUMIN SERPL BCP-MCNC: 4.3 G/DL (ref 3.2–4.9)
ALBUMIN/GLOB SERPL: 1.5 G/DL
ALP SERPL-CCNC: 56 U/L (ref 30–99)
ALT SERPL-CCNC: 6 U/L (ref 2–50)
ANION GAP SERPL CALC-SCNC: 7 MMOL/L (ref 7–16)
AST SERPL-CCNC: 23 U/L (ref 12–45)
BASOPHILS # BLD AUTO: 0.9 % (ref 0–1.8)
BASOPHILS # BLD: 0.06 K/UL (ref 0–0.12)
BILIRUB SERPL-MCNC: 0.4 MG/DL (ref 0.1–1.5)
BUN SERPL-MCNC: 17 MG/DL (ref 8–22)
CALCIUM SERPL-MCNC: 9.3 MG/DL (ref 8.4–10.2)
CHLORIDE SERPL-SCNC: 104 MMOL/L (ref 96–112)
CHOLEST SERPL-MCNC: 203 MG/DL (ref 100–199)
CO2 SERPL-SCNC: 28 MMOL/L (ref 20–33)
CREAT SERPL-MCNC: 0.84 MG/DL (ref 0.5–1.4)
EOSINOPHIL # BLD AUTO: 0.38 K/UL (ref 0–0.51)
EOSINOPHIL NFR BLD: 5.7 % (ref 0–6.9)
ERYTHROCYTE [DISTWIDTH] IN BLOOD BY AUTOMATED COUNT: 40.7 FL (ref 35.9–50)
EST. AVERAGE GLUCOSE BLD GHB EST-MCNC: 143 MG/DL
FASTING STATUS PATIENT QL REPORTED: NORMAL
GFR SERPLBLD CREATININE-BSD FMLA CKD-EPI: 108 ML/MIN/1.73 M 2
GLOBULIN SER CALC-MCNC: 2.9 G/DL (ref 1.9–3.5)
GLUCOSE SERPL-MCNC: 139 MG/DL (ref 65–99)
HBA1C MFR BLD: 6.6 % (ref 4–5.6)
HCT VFR BLD AUTO: 45.9 % (ref 42–52)
HDLC SERPL-MCNC: 96 MG/DL
HGB BLD-MCNC: 15.4 G/DL (ref 14–18)
IMM GRANULOCYTES # BLD AUTO: 0.03 K/UL (ref 0–0.11)
IMM GRANULOCYTES NFR BLD AUTO: 0.5 % (ref 0–0.9)
LDLC SERPL CALC-MCNC: 100 MG/DL
LYMPHOCYTES # BLD AUTO: 2.39 K/UL (ref 1–4.8)
LYMPHOCYTES NFR BLD: 36.1 % (ref 22–41)
MCH RBC QN AUTO: 31 PG (ref 27–33)
MCHC RBC AUTO-ENTMCNC: 33.6 G/DL (ref 33.7–35.3)
MCV RBC AUTO: 92.5 FL (ref 81.4–97.8)
MONOCYTES # BLD AUTO: 0.62 K/UL (ref 0–0.85)
MONOCYTES NFR BLD AUTO: 9.4 % (ref 0–13.4)
NEUTROPHILS # BLD AUTO: 3.14 K/UL (ref 1.82–7.42)
NEUTROPHILS NFR BLD: 47.4 % (ref 44–72)
NRBC # BLD AUTO: 0 K/UL
NRBC BLD-RTO: 0 /100 WBC
PLATELET # BLD AUTO: 218 K/UL (ref 164–446)
PMV BLD AUTO: 9.6 FL (ref 9–12.9)
POTASSIUM SERPL-SCNC: 5.1 MMOL/L (ref 3.6–5.5)
PROT SERPL-MCNC: 7.2 G/DL (ref 6–8.2)
RBC # BLD AUTO: 4.96 M/UL (ref 4.7–6.1)
SODIUM SERPL-SCNC: 139 MMOL/L (ref 135–145)
T4 FREE SERPL-MCNC: 1.43 NG/DL (ref 0.93–1.7)
TRIGL SERPL-MCNC: 34 MG/DL (ref 0–149)
TSH SERPL DL<=0.005 MIU/L-ACNC: 0.05 UIU/ML (ref 0.38–5.33)
WBC # BLD AUTO: 6.6 K/UL (ref 4.8–10.8)

## 2022-04-13 PROCEDURE — 84443 ASSAY THYROID STIM HORMONE: CPT

## 2022-04-13 PROCEDURE — 85025 COMPLETE CBC W/AUTO DIFF WBC: CPT

## 2022-04-13 PROCEDURE — 80053 COMPREHEN METABOLIC PANEL: CPT

## 2022-04-13 PROCEDURE — 36415 COLL VENOUS BLD VENIPUNCTURE: CPT

## 2022-04-13 PROCEDURE — 84439 ASSAY OF FREE THYROXINE: CPT

## 2022-04-13 PROCEDURE — 83036 HEMOGLOBIN GLYCOSYLATED A1C: CPT

## 2022-04-13 PROCEDURE — 80061 LIPID PANEL: CPT

## 2022-04-14 ENCOUNTER — TELEPHONE (OUTPATIENT)
Dept: MEDICAL GROUP | Facility: MEDICAL CENTER | Age: 47
End: 2022-04-14
Payer: COMMERCIAL

## 2022-04-21 ENCOUNTER — OFFICE VISIT (OUTPATIENT)
Dept: MEDICAL GROUP | Facility: MEDICAL CENTER | Age: 47
End: 2022-04-21
Payer: COMMERCIAL

## 2022-04-21 VITALS
DIASTOLIC BLOOD PRESSURE: 60 MMHG | OXYGEN SATURATION: 96 % | HEIGHT: 68 IN | SYSTOLIC BLOOD PRESSURE: 122 MMHG | TEMPERATURE: 98 F | BODY MASS INDEX: 25.76 KG/M2 | HEART RATE: 62 BPM | WEIGHT: 170 LBS

## 2022-04-21 DIAGNOSIS — Z86.39 HISTORY OF GRAVES' DISEASE: ICD-10-CM

## 2022-04-21 DIAGNOSIS — R79.89 ABNORMAL TSH: ICD-10-CM

## 2022-04-21 DIAGNOSIS — E10.69 DYSLIPIDEMIA DUE TO TYPE 1 DIABETES MELLITUS (HCC): ICD-10-CM

## 2022-04-21 DIAGNOSIS — E78.5 DYSLIPIDEMIA DUE TO TYPE 1 DIABETES MELLITUS (HCC): ICD-10-CM

## 2022-04-21 DIAGNOSIS — I10 ESSENTIAL HYPERTENSION: ICD-10-CM

## 2022-04-21 PROCEDURE — 99214 OFFICE O/P EST MOD 30 MIN: CPT | Performed by: STUDENT IN AN ORGANIZED HEALTH CARE EDUCATION/TRAINING PROGRAM

## 2022-04-21 ASSESSMENT — ENCOUNTER SYMPTOMS
CHILLS: 0
SHORTNESS OF BREATH: 0
FEVER: 0
PALPITATIONS: 0

## 2022-04-21 ASSESSMENT — FIBROSIS 4 INDEX: FIB4 SCORE: 1.98

## 2022-04-21 NOTE — PROGRESS NOTES
Subjective:     CC: Lab results    HPI:   Neel presents today for the following;    Problem   History of Graves' Disease    Patient was diagnosed over 10 years ago. He was very asymptomatic. He has eventually had his thyroid ablated.      Abnormal Tsh    Patient TSH was low at 0.4 consistent with his previous TSH from a year ago. Per patient a year a go his endocrinologist decreased his cytomel from 2 pills a day to 1 pill. However patient had a mis understanding and continued to take 2 pills. This continued to point where there was an overlap with the current labs that were ordered. Patient is now taking 1 pill/day.     Dyslipidemia Due to Type 1 Diabetes Mellitus (Hcc)      Lab Results   Component Value Date/Time    CHOLSTRLTOT 203 (H) 04/13/2022 07:28 AM     (H) 04/13/2022 07:28 AM    HDL 96 04/13/2022 07:28 AM    TRIGLYCERIDE 34 04/13/2022 07:28 AM              Essential Hypertension    Stable, he denies dizziness or headaches     Other Postablative Hypothyroidism       Current Outpatient Medications Ordered in Epic   Medication Sig Dispense Refill   • polymixin-trimethoprim (POLYTRIM) 08626-2.1 UNIT/ML-% Solution Place 1 Drop in both eyes every 4 hours. 10 mL 0   • levothyroxine (SYNTHROID) 150 MCG Tab Take 150 mcg by mouth Every morning on an empty stomach.     • sildenafil (REVATIO) 20 MG tablet Take 3-5 Tabs by mouth 1 time daily as needed. 90 Tab 3   • lisinopril (PRINIVIL) 5 MG Tab TAKE 1 TAB BY MOUTH EVERY DAY. 90 Tab 3   • NOVOLOG 100 UNIT/ML SOLN USE AS DIRECTED PER MD NOT TO EXCEED 50 UNITS DAILY AS DIRECTED NEED APPOINTMENT FOR FURTHER REFILLS 20 mL 11   • liothyronine (CYTOMEL) 5 MCG TABS Take 10 mcg by mouth every day.       No current [facility]-ordered facility-administered medications on file.           ROS:  Review of Systems   Constitutional: Negative for chills and fever.   Respiratory: Negative for shortness of breath.    Cardiovascular: Negative for chest pain and palpitations.  "      Objective:     Exam:  /60 (BP Location: Left arm, Patient Position: Sitting, BP Cuff Size: Adult)   Pulse 62   Temp 36.7 °C (98 °F) (Temporal)   Ht 1.727 m (5' 8\")   Wt 77.1 kg (170 lb)   SpO2 96%   BMI 25.85 kg/m²  Body mass index is 25.85 kg/m².    Physical Exam  Constitutional:       Appearance: Normal appearance.   Pulmonary:      Effort: Pulmonary effort is normal.   Musculoskeletal:      Cervical back: Normal range of motion.   Neurological:      Mental Status: He is alert.   Psychiatric:         Mood and Affect: Mood normal.         Behavior: Behavior normal.         Thought Content: Thought content normal.         Judgment: Judgment normal.               Assessment & Plan:     Problem List Items Addressed This Visit     Abnormal TSH     Acute-complicated  -recheck TSH in roughly 1 month         Relevant Orders    TSH WITH REFLEX TO FT4    Dyslipidemia due to type 1 diabetes mellitus (HCC)     Chronic  -no medication indicated  -10 year calculated risk is roughly 5%         Essential hypertension     Chronic-stable  -continue lisinopril         History of Graves' disease              Return in about 6 months (around 10/21/2022) for Annual.    Please note that this dictation was created using voice recognition software. I have made every reasonable attempt to correct obvious errors, but I expect that there are errors of grammar and possibly content that I did not discover before finalizing the note.      "

## 2022-10-21 ENCOUNTER — OFFICE VISIT (OUTPATIENT)
Dept: MEDICAL GROUP | Facility: MEDICAL CENTER | Age: 47
End: 2022-10-21
Payer: COMMERCIAL

## 2022-10-21 VITALS
OXYGEN SATURATION: 97 % | HEART RATE: 77 BPM | HEIGHT: 68 IN | SYSTOLIC BLOOD PRESSURE: 110 MMHG | WEIGHT: 168.4 LBS | DIASTOLIC BLOOD PRESSURE: 70 MMHG | RESPIRATION RATE: 16 BRPM | BODY MASS INDEX: 25.52 KG/M2 | TEMPERATURE: 98.3 F

## 2022-10-21 DIAGNOSIS — Z12.11 COLON CANCER SCREENING: ICD-10-CM

## 2022-10-21 DIAGNOSIS — E10.311: ICD-10-CM

## 2022-10-21 DIAGNOSIS — E89.0 POSTABLATIVE HYPOTHYROIDISM: ICD-10-CM

## 2022-10-21 DIAGNOSIS — Z23 NEED FOR VACCINATION: ICD-10-CM

## 2022-10-21 PROCEDURE — 99396 PREV VISIT EST AGE 40-64: CPT | Performed by: STUDENT IN AN ORGANIZED HEALTH CARE EDUCATION/TRAINING PROGRAM

## 2022-10-21 RX ORDER — LEVOTHYROXINE SODIUM 137 UG/1
137 TABLET ORAL
COMMUNITY
End: 2023-02-15 | Stop reason: SDUPTHER

## 2022-10-21 RX ORDER — INSULIN LISPRO 100 [IU]/ML
INJECTION, SOLUTION INTRAVENOUS; SUBCUTANEOUS
COMMUNITY
Start: 2022-08-10 | Stop reason: DRUGHIGH

## 2022-10-21 ASSESSMENT — FIBROSIS 4 INDEX: FIB4 SCORE: 2.02

## 2022-10-21 NOTE — LETTER
SYSTRAN  Dipak Gonsalez D.O.  [address]  [place] [address]  Fax: [phone]   Authorization for Release/Disclosure of   Protected Health Information   Name: NEEL BOSS : 1975 SSN: [id number]   Address: [address] [place] [address] [place] [address] Phone:    [phone] (home)    I authorize the entity listed below to release/disclose the PHI below to:   SYSTRAN/Dipak Gonsalez D.O. and Dipak Gonsalez D.O.   Provider or Entity Name:  [facility]   Address   City, State, Zip   Phone:      Fax:     Reason for request: continuity of care   Information to be released:    [  ] LAST COLONOSCOPY,  including any PATH REPORT and follow-up  [  ] LAST FIT/COLOGUARD RESULT [  ] LAST DEXA  [  ] LAST MAMMOGRAM  [  ] LAST PAP  [  ] LAST LABS [ xx ] RETINA EXAM REPORT  [  ] IMMUNIZATION RECORDS  [ xx ] Release all info      [  ] Check here and initial the line next to each item to release ALL health information INCLUDING  _____ Care and treatment for drug and / or alcohol abuse  _____ HIV testing, infection status, or AIDS  _____ Genetic Testing    DATES OF SERVICE OR TIME PERIOD TO BE DISCLOSED: _____________  I understand and acknowledge that:  * This Authorization may be revoked at any time by you in writing, except if your health information has already been used or disclosed.  * Your health information that will be used or disclosed as a result of you signing this authorization could be re-disclosed by the recipient. If this occurs, your re-disclosed health information may no longer be protected by State or Federal laws.  * You may refuse to sign this Authorization. Your refusal will not affect your ability to obtain treatment.  * This Authorization becomes effective upon signing and will  on (date) __________.      If no date is indicated, this Authorization will  one (1) year from the signature date.    Name: Neel Boss    Signature:   Date:     10/21/2022        PLEASE FAX REQUESTED RECORDS BACK TO: [phone]

## 2022-10-21 NOTE — PROGRESS NOTES
Subjective:     Subjective:     CC:   Chief Complaint   Patient presents with    Follow-Up     6 mth       HPI:   Neel Casillas is a 47 y.o. male who presents for annual exam    Last Colorectal Cancer Screening: None   Last Tdap: 2017  Received HPV series: No  Hx STDs: No    Exercise: strenuous regular exercise, aerobic > 3 hours a week  Diet: above average     He  has a past medical history of Diabetes, Unspecified disorder of thyroid, and Unspecified essential hypertension (6/17/2015).    He has no past medical history of ASTHMA.  He  has no past surgical history on file.    Family History   Problem Relation Age of Onset    Hypertension Father      Social History     Tobacco Use    Smoking status: Never    Smokeless tobacco: Current     Types: Snuff   Vaping Use    Vaping Use: Never used   Substance Use Topics    Alcohol use: Yes     Comment: 4 per week    Drug use: Never     He  has no history on file for sexual activity.    Patient Active Problem List    Diagnosis Date Noted    History of Graves' disease 04/21/2022    Abnormal TSH 04/21/2022    Frozen shoulder 03/10/2022    Dyslipidemia due to type 1 diabetes mellitus ([facility]) 12/26/2019    Essential hypertension 06/17/2015    Erectile disorder due to medical condition in male patient 12/10/2014    Controlled type 1 diabetes mellitus with ophthalmic complication ([facility]) 06/09/2014    Diabetes mellitus with background retinopathy ([facility]) 06/09/2014    Insulin pump status 06/09/2014    Encounter for long-term (current) use of insulin ([facility]) 06/09/2014    Postablative hypothyroidism 06/09/2014     Current Outpatient Medications   Medication Sig Dispense Refill    HUMALOG 100 UNIT/ML       levothyroxine (SYNTHROID) 137 MCG Tab Take 137 mcg by mouth every morning on an empty stomach.      polymixin-trimethoprim (POLYTRIM) 27311-5.1 UNIT/ML-% Solution Place 1 Drop in both eyes every 4 hours. 10 mL 0    sildenafil (REVATIO) 20 MG tablet Take 3-5 Tabs by mouth 1 time  "daily as needed. 90 Tab 3    lisinopril (PRINIVIL) 5 MG Tab TAKE 1 TAB BY MOUTH EVERY DAY. 90 Tab 3    NOVOLOG 100 UNIT/ML SOLN USE AS DIRECTED PER MD NOT TO EXCEED 50 UNITS DAILY AS DIRECTED NEED APPOINTMENT FOR FURTHER REFILLS 20 mL 11    liothyronine (CYTOMEL) 5 MCG Tab Take 10 mcg by mouth every day.       No current facility-administered medications for this visit.     No Known Allergies    Review of Systems   ROS     Objective:   /70 (BP Location: Left arm, Patient Position: Sitting, BP Cuff Size: Adult)   Pulse 77   Temp 36.8 °C (98.3 °F) (Temporal)   Resp 16   Ht 1.727 m (5' 8\")   Wt 76.4 kg (168 lb 6.4 oz)   SpO2 97%   BMI 25.61 kg/m²      Wt Readings from Last 4 Encounters:   10/21/22 76.4 kg (168 lb 6.4 oz)   04/21/22 77.1 kg (170 lb)   03/10/22 74.8 kg (165 lb)   01/08/22 75.8 kg (167 lb)           Physical Exam:  Physical Exam  Constitutional:       General: He is not in acute distress.     Appearance: He is not ill-appearing.   Pulmonary:      Effort: Pulmonary effort is normal.   Neurological:      Mental Status: He is alert.   Psychiatric:         Mood and Affect: Mood normal.         Behavior: Behavior normal.         Thought Content: Thought content normal.         Judgment: Judgment normal.           Assessment and Plan:     Problem List Items Addressed This Visit       Controlled type 1 diabetes mellitus with ophthalmic complication (HCC)    Relevant Medications    HUMALOG 100 UNIT/ML    Postablative hypothyroidism    Relevant Medications    levothyroxine (SYNTHROID) 137 MCG Tab    Other Relevant Orders    Referral to Endocrinology     Other Visit Diagnoses       Need for vaccination        Relevant Orders    Influenza Vaccine Quad Injection (PF)    Colon cancer screening        Relevant Orders    COLOGUARD (FIT DNA)               Labs per orders  Immunizations per orders  Patient counseled about skin care, diet, supplements, and exercise.  Discussed colorectal cancer screening "     Follow-up: Return in about 1 year (around 10/21/2023) for Annual .

## 2022-12-20 ENCOUNTER — HOSPITAL ENCOUNTER (OUTPATIENT)
Dept: LAB | Facility: MEDICAL CENTER | Age: 47
End: 2022-12-20
Attending: INTERNAL MEDICINE
Payer: COMMERCIAL

## 2022-12-20 LAB
T4 FREE SERPL-MCNC: 1.3 NG/DL (ref 0.93–1.7)
TSH SERPL DL<=0.005 MIU/L-ACNC: 0.1 UIU/ML (ref 0.38–5.33)

## 2022-12-20 PROCEDURE — 84443 ASSAY THYROID STIM HORMONE: CPT

## 2022-12-20 PROCEDURE — 82043 UR ALBUMIN QUANTITATIVE: CPT

## 2022-12-20 PROCEDURE — 83036 HEMOGLOBIN GLYCOSYLATED A1C: CPT

## 2022-12-20 PROCEDURE — 82570 ASSAY OF URINE CREATININE: CPT

## 2022-12-20 PROCEDURE — 84439 ASSAY OF FREE THYROXINE: CPT

## 2022-12-20 PROCEDURE — 36415 COLL VENOUS BLD VENIPUNCTURE: CPT

## 2022-12-21 LAB
CREAT UR-MCNC: 128.34 MG/DL
EST. AVERAGE GLUCOSE BLD GHB EST-MCNC: 146 MG/DL
HBA1C MFR BLD: 6.7 % (ref 4–5.6)
MICROALBUMIN UR-MCNC: <1.2 MG/DL
MICROALBUMIN/CREAT UR: NORMAL MG/G (ref 0–30)

## 2023-01-04 ENCOUNTER — OFFICE VISIT (OUTPATIENT)
Dept: MEDICAL GROUP | Facility: MEDICAL CENTER | Age: 48
End: 2023-01-04
Payer: COMMERCIAL

## 2023-01-04 VITALS
SYSTOLIC BLOOD PRESSURE: 102 MMHG | WEIGHT: 165 LBS | OXYGEN SATURATION: 98 % | DIASTOLIC BLOOD PRESSURE: 62 MMHG | TEMPERATURE: 97 F | HEART RATE: 105 BPM | BODY MASS INDEX: 25.01 KG/M2 | HEIGHT: 68 IN

## 2023-01-04 DIAGNOSIS — I10 ESSENTIAL HYPERTENSION: ICD-10-CM

## 2023-01-04 DIAGNOSIS — N52.1 ERECTILE DISORDER DUE TO MEDICAL CONDITION IN MALE PATIENT: ICD-10-CM

## 2023-01-04 DIAGNOSIS — E10.311: ICD-10-CM

## 2023-01-04 DIAGNOSIS — E11.3299 DIABETES MELLITUS WITH BACKGROUND RETINOPATHY (HCC): ICD-10-CM

## 2023-01-04 DIAGNOSIS — Z79.4 ENCOUNTER FOR LONG-TERM (CURRENT) USE OF INSULIN (HCC): ICD-10-CM

## 2023-01-04 DIAGNOSIS — M75.01 ADHESIVE CAPSULITIS OF BOTH SHOULDERS: ICD-10-CM

## 2023-01-04 DIAGNOSIS — E10.69 DYSLIPIDEMIA DUE TO TYPE 1 DIABETES MELLITUS (HCC): ICD-10-CM

## 2023-01-04 DIAGNOSIS — M75.02 ADHESIVE CAPSULITIS OF BOTH SHOULDERS: ICD-10-CM

## 2023-01-04 DIAGNOSIS — E78.5 DYSLIPIDEMIA DUE TO TYPE 1 DIABETES MELLITUS (HCC): ICD-10-CM

## 2023-01-04 PROCEDURE — 99214 OFFICE O/P EST MOD 30 MIN: CPT | Performed by: STUDENT IN AN ORGANIZED HEALTH CARE EDUCATION/TRAINING PROGRAM

## 2023-01-04 RX ORDER — SILDENAFIL CITRATE 20 MG/1
60-100 TABLET ORAL
Qty: 90 TABLET | Refills: 3 | Status: SHIPPED | OUTPATIENT
Start: 2023-01-04 | End: 2024-02-15 | Stop reason: SDUPTHER

## 2023-01-04 ASSESSMENT — ENCOUNTER SYMPTOMS
FEVER: 0
CHILLS: 0
SHORTNESS OF BREATH: 0

## 2023-01-04 ASSESSMENT — FIBROSIS 4 INDEX: FIB4 SCORE: 2.02

## 2023-01-04 NOTE — PROGRESS NOTES
"Subjective:     CC: follow up    HPI:   Neel presents today for the following;    Problem   Frozen Shoulder    -started 6 months ago and has been worsening   -the pain sharp in nature that is exacerbated when he lifts his arms  -he has decreased ROM in both arms and has difficulty putting his shirt on   -he has seen chiropractor, massage therapist but continues to have pain  -he has [facility] [place] and was told his spine is fine      1/4/23  -patient has gone to PT and his R shoulder has improved by 60% while his L shoulder has improved by 50%     Essential Hypertension    Stable, he denies dizziness or headaches         Current Outpatient Medications Ordered in Epic   Medication Sig Dispense Refill    sildenafil (REVATIO) 20 MG tablet Take 3-5 Tablets by mouth 1 time a day as needed (errectile). 90 Tablet 3    HUMALOG 100 UNIT/ML       levothyroxine (SYNTHROID) 137 MCG Tab Take 137 mcg by mouth every morning on an empty stomach.      polymixin-trimethoprim (POLYTRIM) 48995-1.1 UNIT/ML-% Solution Place 1 Drop in both eyes every 4 hours. 10 mL 0    lisinopril (PRINIVIL) 5 MG Tab TAKE 1 TAB BY MOUTH EVERY DAY. 90 Tab 3    NOVOLOG 100 UNIT/ML SOLN USE AS DIRECTED PER MD NOT TO EXCEED 50 UNITS DAILY AS DIRECTED NEED APPOINTMENT FOR FURTHER REFILLS 20 mL 11    liothyronine (CYTOMEL) 5 MCG Tab Take 10 mcg by mouth every day.       No current [facility]-ordered facility-administered medications on file.           ROS:  Review of Systems   Constitutional:  Negative for chills and fever.   Respiratory:  Negative for shortness of breath.    Cardiovascular:  Negative for chest pain.     Objective:     Exam:  /62 (BP Location: Left arm, Patient Position: Sitting, BP Cuff Size: Adult)   Pulse (!) 105   Temp 36.1 °C (97 °F) (Temporal)   Ht 1.727 m (5' 8\")   Wt 74.8 kg (165 lb)   SpO2 98%   BMI 25.09 kg/m²  Body mass index is 25.09 kg/m².    Physical Exam  Constitutional:       General: He is not in acute distress.     " Appearance: He is not ill-appearing.   Pulmonary:      Effort: Pulmonary effort is normal.   Neurological:      Mental Status: He is alert.   Psychiatric:         Mood and Affect: Mood normal.         Behavior: Behavior normal.         Thought Content: Thought content normal.         Judgment: Judgment normal.             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 (HCC)    Diabetes mellitus with background retinopathy (HCC)    Dyslipidemia due to type 1 diabetes mellitus (HCC)    Encounter for long-term (current) use of insulin (HCC)    Erectile disorder due to medical condition in male patient    Relevant Medications    sildenafil (REVATIO) 20 MG tablet    Essential hypertension     Chronic-stable  -continue lisinopril 5mg          Relevant Medications    sildenafil (REVATIO) 20 MG tablet    Frozen shoulder     Chronic  -patient was offered referral to orthopedic surgery, but he would like to hold off for now            HCC Gap Form    Diagnosis to address: E10.311 - Controlled type 1 diabetes mellitus with left eye affected by retinopathy and macular edema, unspecified retinopathy severity (HCC)  Assessment and plan: Chronic, stable, as based on today's assessment and impact on other conditions evaluated today. Continue with current treatment plan with endocrinology. Follow-up with specialist as directed, but at least annually.  Diagnosis: E11.3299 - Diabetes mellitus with background retinopathy (HCC)  Assessment and plan: Chronic, stable, as based on today's assessment and impact on other conditions evaluated today. Continue with current treatment plan. Follow-up with specialist as directed, but at least annually.  Diagnosis: E10.69, E78.5 - Dyslipidemia due to type 1 diabetes mellitus (HCC)  Assessment and plan: Chronic, stable. Continue with current defined treatment plan with life style modification and monitoring  annually. Follow-up at least annually.  Diagnosis: Z79.4 - Encounter for long-term (current) use of insulin (HCC)  Assessment and plan: Chronic, stable, as based on today's assessment and impact on other conditions evaluated today. Continue with current treatment plan. Follow-up with specialist as directed, but at least annually.  Last edited 01/04/23 10:26 PST by Dipak Gonsalez D.O.             Please note that this dictation was created using voice recognition software. I have made every reasonable attempt to correct obvious errors, but I expect that there are errors of grammar and possibly content that I did not discover before finalizing the note.

## 2023-01-04 NOTE — ASSESSMENT & PLAN NOTE
Chronic  -patient was offered referral to orthopedic surgery, but he would like to hold off for now

## 2023-02-15 ENCOUNTER — OFFICE VISIT (OUTPATIENT)
Dept: ENDOCRINOLOGY | Facility: MEDICAL CENTER | Age: 48
End: 2023-02-15
Payer: COMMERCIAL

## 2023-02-15 VITALS
BODY MASS INDEX: 26.07 KG/M2 | SYSTOLIC BLOOD PRESSURE: 118 MMHG | HEART RATE: 68 BPM | DIASTOLIC BLOOD PRESSURE: 62 MMHG | OXYGEN SATURATION: 100 % | WEIGHT: 172 LBS | HEIGHT: 68 IN

## 2023-02-15 DIAGNOSIS — E78.5 DYSLIPIDEMIA: ICD-10-CM

## 2023-02-15 DIAGNOSIS — H35.00 RETINOPATHY: ICD-10-CM

## 2023-02-15 DIAGNOSIS — Z79.4 LONG TERM (CURRENT) USE OF INSULIN (HCC): ICD-10-CM

## 2023-02-15 DIAGNOSIS — E55.9 VITAMIN D DEFICIENCY: ICD-10-CM

## 2023-02-15 DIAGNOSIS — E10.9 TYPE 1 DIABETES MELLITUS WITHOUT COMPLICATION (HCC): ICD-10-CM

## 2023-02-15 DIAGNOSIS — E03.9 HYPOTHYROIDISM, UNSPECIFIED TYPE: ICD-10-CM

## 2023-02-15 DIAGNOSIS — Z96.41 INSULIN PUMP IN PLACE: ICD-10-CM

## 2023-02-15 PROCEDURE — 99205 OFFICE O/P NEW HI 60 MIN: CPT

## 2023-02-15 PROCEDURE — 99213 OFFICE O/P EST LOW 20 MIN: CPT

## 2023-02-15 RX ORDER — LISINOPRIL 5 MG/1
5 TABLET ORAL
Qty: 90 TABLET | Refills: 3 | Status: SHIPPED | OUTPATIENT
Start: 2023-02-15 | End: 2024-03-24

## 2023-02-15 RX ORDER — INSULIN LISPRO 100 [IU]/ML
INJECTION, SOLUTION INTRAVENOUS; SUBCUTANEOUS
Qty: 90 ML | Refills: 3 | Status: SHIPPED | OUTPATIENT
Start: 2023-02-15

## 2023-02-15 RX ORDER — LEVOTHYROXINE SODIUM 137 UG/1
137 TABLET ORAL
Qty: 90 TABLET | Refills: 0 | Status: SHIPPED | OUTPATIENT
Start: 2023-02-15 | End: 2023-05-15

## 2023-02-15 RX ORDER — LIOTHYRONINE SODIUM 5 UG/1
5 TABLET ORAL DAILY
Qty: 90 TABLET | Refills: 0 | Status: SHIPPED | OUTPATIENT
Start: 2023-02-15

## 2023-02-15 ASSESSMENT — FIBROSIS 4 INDEX: FIB4 SCORE: 2.02

## 2023-02-15 NOTE — PROGRESS NOTES
"RN-CDE Note    Subjective:   Endocrinology Clinic Progress Note  PCP: Dipak Gonsalez D.O.    HPI:  Neel Casillas is a 47 y.o. old patient who is seen today by the Diabetes Nurse Specialist for review of his type 1 diabetes.  He is here to establish care at [facility].    He also has hypothyroid and is currently on Levothyroxine 137 mcg daily and Liothyronine 5 mcg daily.    Latest Reference Range & Units 04/13/22 07:28 12/20/22 14:52   TSH 0.380 - 5.330 uIU/mL 0.047 (L) 0.105 (L)   Free T-4 0.93 - 1.70 ng/dL 1.43 1.30   (L): Data is abnormally low  Recent changes in health: none  DM:   Last A1c:   Lab Results   Component Value Date/Time    HBA1C 6.7 (H) 12/20/2022 02:52 PM      Previous A1c was 6.6 on 4/13/22  A1C GOAL: < 7    Diabetes Medications:   Humalog via Medtronic pump    Changes made: change sensitivity from 1:30 to 1:20 from 1:30 pm to 4:30 pm  Exercise: strenuous regular exercise, aerobic > 3 hours a week  Diet: \"healthy\" diet  in general  Patient's body mass index is 26.15 kg/m². Exercise and nutrition counseling were performed at this visit.    Glucose monitoring frequency: Using Guardian Sensor    Hypoglycemic episodes: yes - on occasion.  Needed help treating about 12 times in his lifetime.  States about 7 years since 911 call  Last Retinal Exam: on file and up-to-date  Daily Foot Exam: Yes denies problem.  Foot Exam:  Monofilament:  up to date  Lab Results   Component Value Date/Time    MICROALBCALC 4.8 12/08/2014 08:45 AM    MALBCRT see below 12/20/2022 02:52 PM    MICROALBUR <1.2 12/20/2022 02:52 PM    MICRALB 8.8 12/08/2014 08:45 AM        ACR Albumin/Creatinine Ratio goal <30     HTN:   Blood pressure goal <130/<80 at goal.   Currently Rx ACE/ARB: Yes  Lisinopril 5 mg daily    Dyslipidemia:    Lab Results   Component Value Date/Time    CHOLSTRLTOT 203 (H) 04/13/2022 07:28 AM     (H) 04/13/2022 07:28 AM    HDL 96 04/13/2022 07:28 AM    TRIGLYCERIDE 34 04/13/2022 07:28 AM    "      Currently Rx Statin: No     He  reports that he has never smoked. His smokeless tobacco use includes snuff.      Plan:     Discussed and educated on:   - All medications, side effects and compliance (discussed carefully)  - Annual eye examinations at Ophthalmology  - Foot Care: what to look for when checking feet every day  - HbA1C: target  - Home glucose monitoring emphasized  - Weight control and daily exercise    Recommended medication changes: see above

## 2023-02-15 NOTE — PROGRESS NOTES
Chief Complaint:  Consult requested by Dipak Gonsalez D.O. for initial evaluation of Type 2 Diabetes Mellitus    HPI:   Neel Casillas is a 47 y.o. male     Controlled type 1 diabetes mellitus without complication:   Diagnosed when he was 11  He denies hospitalizations for DKA in the past.    POC a1c on 12/20/2022 at 6.7%  POC a1c on 4/13/2022 at 6.6%  POC a1c on 11/5/2020 at 6.6%    Medication Regimen:   Medtronic 770 with guardian and Humalog               He denies hypoglycemic episodes  He  denies hypoglycemic unawareness.   He denies episodes of severe hypoglycemia requiring third party assistance.    He  is not wearing a medical alert bracelet or necklace.    He does not a glucagon emergency kit.    He denies attending diabetes education classes.  Diet: Healthy in general   Exercise: 5-6x/week     Diabetes Complications   He denies history of peripheral sensory neuropathy.    He denies numbness, tingling in both feet.    He denies history of foot sores.   He denies history of kidney damage.    He is on ACE inhibitor or ARB.   He denies history of coronary artery disease.    He  denies history of stroke and denies TIA.    He denies history of PAD.  He reports history of hyperlipidemia.    No microalbuminuria  Currently taking Lisinopril 5 mg     Latest Reference Range & Units 12/20/22 14:52   Micro Alb Creat Ratio 0 - 30 mg/g see below   Creatinine, Urine mg/dL 128.34   Microalbumin, Urine Random mg/dL <1.2     2.  Retinopathy:  He  reports history of retinopathy.    He reports laser eye surgery.   Last eye exam: Dr. Moulton, seeing twice a year now-Retina specialist   He also sees optometry-     3. Dyslipidemia:  No currently on a statin   Latest Reference Range & Units 04/13/22 07:28   Cholesterol,Tot 100 - 199 mg/dL 203 (H)   Triglycerides 0 - 149 mg/dL 34   HDL >=40 mg/dL 96   LDL <100 mg/dL 100 (H)     4.  Hypothyroidism, postablative:  Currently taking Levothyroxine 137 mcg daily and Cytomel 5 mcg  daily on an empty stomach  Taking his medication every morning   Reports good compliant with his medication regimen  Denies taking iron, calcium, antiacids  Denies taking multivitamins  Denies taking biotin    Denies fatigue, constipation, palpitations     Latest Reference Range & Units 12/20/22 14:52   TSH 0.380 - 5.330 uIU/mL 0.105 (L)   Free T-4 0.93 - 1.70 ng/dL 1.30     5.  Vitamin D deficiency:  I will evaluate levels    6.  Long-term current use of insulin:  On insulin for diabetes treatment    7.  Insulin pump in place:  Medtronic 770 with Guardian    ROS:     CONS:     No fever, no chills, no weight loss, no fatigue   EYES:      No diplopia, no blurry vision, no redness of eyes, no swelling of eyelids   ENT:    No hearing loss, No ear pain, No sore throat, no dysphagia, no neck swelling   CV:     No chest pain, no palpitations, no claudication, no orthopnea, no PND   PULM:    No SOB, no cough, no hemoptysis, no wheezing    GI:   No nausea, no vomiting, no diarrhea, no constipation, no bloody stools   :  Passing urine well, no dysuria, no hematuria   ENDO:   No polyuria, no polydipsia, no heat intolerance, no cold intolerance   NEURO: No headaches, no dizziness, no convulsions, no tremors   MUSC:  No joint swellings, no arthralgias, no myalgias, no weakness   SKIN:   No rash, no ulcers, no dry skin   PSYCH:   No depression, no anxiety, no difficulty sleeping       Past Medical History:  Patient Active Problem List    Diagnosis Date Noted    History of Graves' disease 04/21/2022    Abnormal TSH 04/21/2022    Frozen shoulder 03/10/2022    Dyslipidemia due to type 1 diabetes mellitus (HCC) 12/26/2019    Essential hypertension 06/17/2015    Erectile disorder due to medical condition in male patient 12/10/2014    Controlled type 1 diabetes mellitus with ophthalmic complication (HCC) 06/09/2014    Diabetes mellitus with background retinopathy ([facility]) 06/09/2014    Insulin pump status 06/09/2014    Encounter for  long-term (current) use of insulin (HCC) 06/09/2014    Postablative hypothyroidism 06/09/2014       Past Surgical History:  No past surgical history on file.     Allergies:  Patient has no known allergies.     Current Medications:    Current Outpatient Medications:     sildenafil (REVATIO) 20 MG tablet, Take 3-5 Tablets by mouth 1 time a day as needed (errectile)., Disp: 90 Tablet, Rfl: 3    HUMALOG 100 UNIT/ML, , Disp: , Rfl:     levothyroxine (SYNTHROID) 137 MCG Tab, Take 137 mcg by mouth every morning on an empty stomach., Disp: , Rfl:     polymixin-trimethoprim (POLYTRIM) 89374-0.1 UNIT/ML-% Solution, Place 1 Drop in both eyes every 4 hours., Disp: 10 mL, Rfl: 0    lisinopril (PRINIVIL) 5 MG Tab, TAKE 1 TAB BY MOUTH EVERY DAY., Disp: 90 Tab, Rfl: 3    NOVOLOG 100 UNIT/ML SOLN, USE AS DIRECTED PER MD NOT TO EXCEED 50 UNITS DAILY AS DIRECTED NEED APPOINTMENT FOR FURTHER REFILLS, Disp: 20 mL, Rfl: 11    liothyronine (CYTOMEL) 5 MCG Tab, Take 10 mcg by mouth every day., Disp: , Rfl:     Social History:  Social History     Socioeconomic History    Marital status:      Spouse name: Not on file    Number of children: Not on file    Years of education: Not on file    Highest education level: Not on file   Occupational History    Not on file   Tobacco Use    Smoking status: Never    Smokeless tobacco: Current     Types: Snuff   Vaping Use    Vaping Use: Never used   Substance and Sexual Activity    Alcohol use: Yes     Comment: 4 per week    Drug use: Never    Sexual activity: Not on file   Other Topics Concern    Not on file   Social History Narrative    Not on file     Social Determinants of Health     Financial Resource Strain: Not on file   Food Insecurity: Not on file   Transportation Needs: Not on file   Physical Activity: Not on file   Stress: Not on file   Social Connections: Not on file   Intimate Partner Violence: Not on file   Housing Stability: Not on file        Family History:   Family History  "  Problem Relation Age of Onset    Hypertension Father        PHYSICAL EXAM:   Vital signs: /62 (BP Location: Left arm, Patient Position: Sitting)   Pulse 68   Ht 1.727 m (5' 8\")   Wt 78 kg (172 lb)   SpO2 100%   BMI 26.15 kg/m²   GENERAL: Well-developed, well-nourished  in no apparent distress.   EYE: No ocular and eyelid asymmetry, Anicteric sclerae,  PERRL, No exophthalmos or lidlag  HENT: Hearing grossly intact, Normocephalic, atraumatic.   NECK: Supple. Trachea midline. thyroid is normal in size without nodules or tenderness  CARDIOVASCULAR: Regular rate and rhythm. No murmurs, rubs, or gallops.   LUNGS: Clear to auscultation bilaterally   EXTREMITIES: No clubbing, cyanosis, or edema.   NEUROLOGICAL: Cranial nerves II-XII are grossly intact   Symmetric reflexes at the patella no proximal muscle weakness, No visible tremor with both outstretched hands  LYMPH: No cervical, supraclavicular,  adenopathy palpated.   SKIN: No rashes, lesions. Turgor is normal.  FOOT: Normal sensation to monofilament testing, normal pulses, no ulcers.  Normal Vibration quantitative sensation test.    Labs:  Lab Results   Component Value Date/Time    HBA1C 6.7 (H) 12/20/2022 1452    AVGLUC 146 12/20/2022 1452     Lab Results   Component Value Date/Time    CHOLSTRLTOT 203 (H) 04/13/2022 0728    TRIGLYCERIDE 34 04/13/2022 0728    HDL 96 04/13/2022 0728     (H) 04/13/2022 0728       Lab Results   Component Value Date/Time    MICROALBCALC 4.8 12/08/2014 08:45 AM    MALBCRT see below 12/20/2022 02:52 PM    MICROALBUR <1.2 12/20/2022 02:52 PM    MICRALB 8.8 12/08/2014 08:45 AM        Lab Results   Component Value Date/Time    TSHULTRASEN 0.105 (L) 12/20/2022 1452         ASSESSMENT/PLAN:   1. Type 1 diabetes mellitus without complication (HCC)  Controlled with an A1c of 6.7%  Lifestyle modifications discussed  We discussed different insulin pumps, patient would like me to parachuted down on a Dexcom for him-we discussed " that he is not still under warranty for his metronic but he would like to see the pricing on tandem and Dexcom     Changes made on insulin pump: change sensitivity from 1:30 to 1:20 from 1:30 pm to 4:30 pm      - lisinopril (PRINIVIL) 5 MG Tab; Take 1 Tablet by mouth every day.  Dispense: 90 Tablet; Refill: 3  - Referral to Nutrition Services  - Comp Metabolic Panel; Future  - C-PEPTIDE; Future  - LIAN-65; Future  - IA-2 AUTOANTIBODIES  - MICROALBUMIN CREAT RATIO URINE; Future    2. Retinopathy  Stable  Followed by retina specialist    3. Dyslipidemia  Unstable  We will evaluate levels to determine if statin is prudent   - Lipid Profile; Future    4. Hypothyroidism, unspecified type  Clinically stable  Biochemically unstable  New regimen: Levothyroxine 137 mcg 6 days a week, half a pill 1 day a week, Cytomel 5 mcg daily    - levothyroxine (SYNTHROID) 137 MCG Tab; Take 1 Tablet by mouth every morning on an empty stomach.  Dispense: 90 Tablet; Refill: 0  - liothyronine (CYTOMEL) 5 MCG Tab; Take 1 Tablet by mouth every day.  Dispense: 90 Tablet; Refill: 0  - FREE THYROXINE; Future  - TSH; Future    5. Vitamin D deficiency  I will evaluate levels  - VITAMIN D,25 HYDROXY (DEFICIENCY); Future    6. Long term (current) use of insulin (HCC)  Humalog for diabetes treatment  - insulin lispro 100 UNIT/ML INJ; Using up to 100 units per day via insulin pump  Dispense: 90 mL; Refill: 3    7. Insulin pump in place  Medtronic 770    Disposition: Make an appointment to follow-up in 3 months  Do blood work 2 weeks prior to next appointment    Thank you kindly for allowing me to participate in the diabetes care plan for this patient.    Pardeep Kennedy A.P.R.N.  02/15/23    CC:   Dipak Gonsalez D.O.

## 2023-05-09 ENCOUNTER — HOSPITAL ENCOUNTER (OUTPATIENT)
Dept: LAB | Facility: MEDICAL CENTER | Age: 48
End: 2023-05-09
Payer: COMMERCIAL

## 2023-05-09 DIAGNOSIS — E55.9 VITAMIN D DEFICIENCY: ICD-10-CM

## 2023-05-09 DIAGNOSIS — E03.9 HYPOTHYROIDISM, UNSPECIFIED TYPE: ICD-10-CM

## 2023-05-09 DIAGNOSIS — E10.9 TYPE 1 DIABETES MELLITUS WITHOUT COMPLICATION (HCC): ICD-10-CM

## 2023-05-09 DIAGNOSIS — E78.5 DYSLIPIDEMIA: ICD-10-CM

## 2023-05-09 LAB
25(OH)D3 SERPL-MCNC: 34 NG/ML (ref 30–100)
ALBUMIN SERPL BCP-MCNC: 3.9 G/DL (ref 3.2–4.9)
ALBUMIN/GLOB SERPL: 1.4 G/DL
ALP SERPL-CCNC: 52 U/L (ref 30–99)
ALT SERPL-CCNC: 26 U/L (ref 2–50)
ANION GAP SERPL CALC-SCNC: 9 MMOL/L (ref 7–16)
AST SERPL-CCNC: 66 U/L (ref 12–45)
BILIRUB SERPL-MCNC: 0.6 MG/DL (ref 0.1–1.5)
BUN SERPL-MCNC: 15 MG/DL (ref 8–22)
CALCIUM ALBUM COR SERPL-MCNC: 8.8 MG/DL (ref 8.5–10.5)
CALCIUM SERPL-MCNC: 8.7 MG/DL (ref 8.4–10.2)
CHLORIDE SERPL-SCNC: 100 MMOL/L (ref 96–112)
CHOLEST SERPL-MCNC: 173 MG/DL (ref 100–199)
CO2 SERPL-SCNC: 27 MMOL/L (ref 20–33)
CREAT SERPL-MCNC: 0.98 MG/DL (ref 0.5–1.4)
CREAT UR-MCNC: 256.2 MG/DL
FASTING STATUS PATIENT QL REPORTED: NORMAL
GFR SERPLBLD CREATININE-BSD FMLA CKD-EPI: 95 ML/MIN/1.73 M 2
GLOBULIN SER CALC-MCNC: 2.7 G/DL (ref 1.9–3.5)
GLUCOSE SERPL-MCNC: 194 MG/DL (ref 65–99)
HDLC SERPL-MCNC: 88 MG/DL
LDLC SERPL CALC-MCNC: 76 MG/DL
MICROALBUMIN UR-MCNC: <1.2 MG/DL
MICROALBUMIN/CREAT UR: NORMAL MG/G (ref 0–30)
POTASSIUM SERPL-SCNC: 4 MMOL/L (ref 3.6–5.5)
PROT SERPL-MCNC: 6.6 G/DL (ref 6–8.2)
SODIUM SERPL-SCNC: 136 MMOL/L (ref 135–145)
T4 FREE SERPL-MCNC: 0.86 NG/DL (ref 0.93–1.7)
TRIGL SERPL-MCNC: 45 MG/DL (ref 0–149)
TSH SERPL DL<=0.005 MIU/L-ACNC: 3.54 UIU/ML (ref 0.38–5.33)

## 2023-05-09 PROCEDURE — 84439 ASSAY OF FREE THYROXINE: CPT

## 2023-05-09 PROCEDURE — 80053 COMPREHEN METABOLIC PANEL: CPT

## 2023-05-09 PROCEDURE — 82306 VITAMIN D 25 HYDROXY: CPT

## 2023-05-09 PROCEDURE — 86341 ISLET CELL ANTIBODY: CPT

## 2023-05-09 PROCEDURE — 84681 ASSAY OF C-PEPTIDE: CPT

## 2023-05-09 PROCEDURE — 82570 ASSAY OF URINE CREATININE: CPT

## 2023-05-09 PROCEDURE — 84443 ASSAY THYROID STIM HORMONE: CPT

## 2023-05-09 PROCEDURE — 36415 COLL VENOUS BLD VENIPUNCTURE: CPT

## 2023-05-09 PROCEDURE — 82043 UR ALBUMIN QUANTITATIVE: CPT

## 2023-05-09 PROCEDURE — 80061 LIPID PANEL: CPT

## 2023-05-10 LAB — C PEPTIDE SERPL-MCNC: <0.1 NG/ML (ref 0.5–3.3)

## 2023-05-12 LAB
GAD65 AB SER IA-ACNC: <5 IU/ML (ref 0–5)
ISLET CELL512 AB SER IA-ACNC: 26.1 U/ML (ref 0–7.4)

## 2023-05-15 ENCOUNTER — OFFICE VISIT (OUTPATIENT)
Dept: ENDOCRINOLOGY | Facility: MEDICAL CENTER | Age: 48
End: 2023-05-15
Payer: COMMERCIAL

## 2023-05-15 VITALS
HEART RATE: 79 BPM | BODY MASS INDEX: 25.65 KG/M2 | OXYGEN SATURATION: 96 % | HEIGHT: 69 IN | SYSTOLIC BLOOD PRESSURE: 104 MMHG | DIASTOLIC BLOOD PRESSURE: 58 MMHG | WEIGHT: 173.2 LBS

## 2023-05-15 DIAGNOSIS — H35.00 RETINOPATHY: ICD-10-CM

## 2023-05-15 DIAGNOSIS — Z96.41 INSULIN PUMP IN PLACE: ICD-10-CM

## 2023-05-15 DIAGNOSIS — E55.9 VITAMIN D DEFICIENCY: ICD-10-CM

## 2023-05-15 DIAGNOSIS — Z79.4 LONG TERM (CURRENT) USE OF INSULIN (HCC): ICD-10-CM

## 2023-05-15 DIAGNOSIS — E89.0 HYPOTHYROIDISM, POSTSURGICAL: ICD-10-CM

## 2023-05-15 DIAGNOSIS — E10.9 CONTROLLED TYPE 1 DIABETES MELLITUS WITHOUT COMPLICATION (HCC): ICD-10-CM

## 2023-05-15 PROCEDURE — 99213 OFFICE O/P EST LOW 20 MIN: CPT

## 2023-05-15 PROCEDURE — 3074F SYST BP LT 130 MM HG: CPT

## 2023-05-15 PROCEDURE — 3078F DIAST BP <80 MM HG: CPT

## 2023-05-15 PROCEDURE — 99214 OFFICE O/P EST MOD 30 MIN: CPT

## 2023-05-15 RX ORDER — LEVOTHYROXINE SODIUM 137 UG/1
137 TABLET ORAL
Qty: 90 TABLET | Refills: 4 | Status: SHIPPED | OUTPATIENT
Start: 2023-05-15 | End: 2024-01-18 | Stop reason: SDUPTHER

## 2023-05-15 ASSESSMENT — FIBROSIS 4 INDEX: FIB4 SCORE: 2.79

## 2023-05-15 NOTE — PROGRESS NOTES
Chief Complaint:  Consult requested by Dipak Gonsalez D.O. for initial evaluation of Type 2 Diabetes Mellitus    HPI:   Neel Casillas is a 47 y.o. male     Controlled type 1 diabetes mellitus without complication:   Diagnosed when he was 11    POC A1c on 5/15/2022 was 6.3%  POC a1c on 12/20/2022 at 6.7%  POC a1c on 4/13/2022 at 6.6%  POC a1c on 11/5/2020 at 6.6%    Medication Regimen:   Medtronic 770 with guardian and Humalog with guadian            He denies hypoglycemic episodes  He  denies hypoglycemic unawareness.     Diet: Healthy in general   Exercise: 5-6x/week     Diabetes Complications   He denies history of peripheral sensory neuropathy.    He denies numbness, tingling in both feet.    He denies history of foot sores.      Latest Reference Range & Units 05/09/23 07:23   C-Peptide 0.5 - 3.3 ng/mL <0.1 (L)   IA-2, Autoantibody 0.0 - 7.4 U/mL 26.1 (H)       No microalbuminuria  Currently taking Lisinopril 5 mg     Latest Reference Range & Units 05/09/23 07:23   Micro Alb Creat Ratio 0 - 30 mg/g see below   Creatinine, Urine mg/dL 256.20   Microalbumin, Urine Random mg/dL <1.2      Latest Reference Range & Units 05/09/23 07:23   GFR (CKD-EPI) >60 mL/min/1.73 m 2 95      Latest Reference Range & Units 05/09/23 07:23   LIAN Antibody 0.0 - 5.0 IU/mL <5.0     2.  Retinopathy:  He  reports history of retinopathy.    He reports laser eye surgery.   Last eye exam: Dr. Moulton, seeing twice a year now-Retina specialist   He also sees optometry-     3. Dyslipidemia:  No currently on a statin   Latest Reference Range & Units 05/09/23 07:23   Cholesterol,Tot 100 - 199 mg/dL 173   Triglycerides 0 - 149 mg/dL 45   HDL >=40 mg/dL 88   LDL <100 mg/dL 76     4.  Hypothyroidism, postablative:  Currently taking Levothyroxine 137 mcg 6 and Cytomel 5 mcg daily on an empty stomach  Taking his medication every morning   Reports good compliant with his medication regimen  Denies taking iron, calcium, antiacids  Denies taking  multivitamins  Denies taking biotin    Denies constipation, palpitations, brain fogginess   Reports fatigue, sleepy   Latest Reference Range & Units 05/09/23 07:23   TSH 0.380 - 5.330 uIU/mL 3.540   Free T-4 0.93 - 1.70 ng/dL 0.86 (L)       5.  Vitamin D deficiency:   Latest Reference Range & Units 05/09/23 07:23   25-Hydroxy   Vitamin D 25 30 - 100 ng/mL 34     6.  Long-term current use of insulin:  On insulin for diabetes treatment    7.  Insulin pump in place:  Medtronic 770 with Guardian    ROS:     CONS:     No fever, no chills, no weight loss, no fatigue   EYES:      No diplopia, no blurry vision, no redness of eyes, no swelling of eyelids   ENT:    No hearing loss, No ear pain, No sore throat, no dysphagia, no neck swelling   CV:     No chest pain, no palpitations, no claudication, no orthopnea, no PND   PULM:    No SOB, no cough, no hemoptysis, no wheezing    GI:   No nausea, no vomiting, no diarrhea, no constipation, no bloody stools   :  Passing urine well, no dysuria, no hematuria   ENDO:   No polyuria, no polydipsia, no heat intolerance, no cold intolerance   NEURO: No headaches, no dizziness, no convulsions, no tremors   MUSC:  No joint swellings, no arthralgias, no myalgias, no weakness   SKIN:   No rash, no ulcers, no dry skin   PSYCH:   No depression, no anxiety, no difficulty sleeping       Past Medical History:  Patient Active Problem List    Diagnosis Date Noted    History of Graves' disease 04/21/2022    Abnormal TSH 04/21/2022    Frozen shoulder 03/10/2022    Dyslipidemia due to type 1 diabetes mellitus ([facility]) 12/26/2019    Essential hypertension 06/17/2015    Erectile disorder due to medical condition in male patient 12/10/2014    Controlled type 1 diabetes mellitus with ophthalmic complication ([facility]) 06/09/2014    Diabetes mellitus with background retinopathy ([facility]) 06/09/2014    Insulin pump status 06/09/2014    Encounter for long-term (current) use of insulin ([facility]) 06/09/2014    Postablative  hypothyroidism 06/09/2014       Past Surgical History:  No past surgical history on file.     Allergies:  Patient has no known allergies.     Current Medications:    Current Outpatient Medications:     insulin lispro 100 UNIT/ML INJ, Using up to 100 units per day via insulin pump, Disp: 90 mL, Rfl: 3    levothyroxine (SYNTHROID) 137 MCG Tab, Take 1 Tablet by mouth every morning on an empty stomach., Disp: 90 Tablet, Rfl: 0    liothyronine (CYTOMEL) 5 MCG Tab, Take 1 Tablet by mouth every day., Disp: 90 Tablet, Rfl: 0    lisinopril (PRINIVIL) 5 MG Tab, Take 1 Tablet by mouth every day., Disp: 90 Tablet, Rfl: 3    sildenafil (REVATIO) 20 MG tablet, Take 3-5 Tablets by mouth 1 time a day as needed (errectile)., Disp: 90 Tablet, Rfl: 3    polymixin-trimethoprim (POLYTRIM) 51573-0.1 UNIT/ML-% Solution, Place 1 Drop in both eyes every 4 hours., Disp: 10 mL, Rfl: 0    Social History:  Social History     Socioeconomic History    Marital status:      Spouse name: Not on file    Number of children: Not on file    Years of education: Not on file    Highest education level: Not on file   Occupational History    Not on file   Tobacco Use    Smoking status: Never    Smokeless tobacco: Current     Types: Snuff   Vaping Use    Vaping Use: Never used   Substance and Sexual Activity    Alcohol use: Yes     Comment: 4 per week    Drug use: Never    Sexual activity: Not on file   Other Topics Concern    Not on file   Social History Narrative    Not on file     Social Determinants of Health     Financial Resource Strain: Not on file   Food Insecurity: Not on file   Transportation Needs: Not on file   Physical Activity: Not on file   Stress: Not on file   Social Connections: Not on file   Intimate Partner Violence: Not on file   Housing Stability: Not on file        Family History:   Family History   Problem Relation Age of Onset    Hypertension Father        PHYSICAL EXAM:   Vital signs: /58 (BP Location: Left arm, Patient  "Position: Sitting)   Pulse 79   Ht 1.753 m (5' 9\")   Wt 78.6 kg (173 lb 3.2 oz)   SpO2 96%   BMI 25.58 kg/m²   GENERAL: Well-developed, well-nourished  in no apparent distress.   EYE: No ocular and eyelid asymmetry, Anicteric sclerae,  PERRL, No exophthalmos or lidlag  HENT: Hearing grossly intact, Normocephalic, atraumatic.   NECK: Supple. Trachea midline. thyroid is normal in size without nodules or tenderness  CARDIOVASCULAR: Regular rate and rhythm. No murmurs, rubs, or gallops.   LUNGS: Clear to auscultation bilaterally   EXTREMITIES: No clubbing, cyanosis, or edema.   NEUROLOGICAL: Cranial nerves II-XII are grossly intact   Symmetric reflexes at the patella no proximal muscle weakness, No visible tremor with both outstretched hands  LYMPH: No cervical, supraclavicular,  adenopathy palpated.   SKIN: No rashes, lesions. Turgor is normal.  FOOT: Normal sensation to monofilament testing, normal pulses, no ulcers.  Normal Vibration quantitative sensation test.    Labs:  Lab Results   Component Value Date/Time    HBA1C 6.7 (H) 12/20/2022 1452    AVGLUC 146 12/20/2022 1452     Lab Results   Component Value Date/Time    CHOLSTRLTOT 203 (H) 04/13/2022 0728    TRIGLYCERIDE 34 04/13/2022 0728    HDL 96 04/13/2022 0728     (H) 04/13/2022 0728       Lab Results   Component Value Date/Time    MICROALBCALC 4.8 12/08/2014 08:45 AM    MALBCRT see below 05/09/2023 07:23 AM    MICROALBUR <1.2 05/09/2023 07:23 AM    MICRALB 8.8 12/08/2014 08:45 AM        Lab Results   Component Value Date/Time    TSHULTRASEN 0.105 (L) 12/20/2022 1452         ASSESSMENT/PLAN:   1. Controlled type 1 diabetes mellitus without complication (HCC)  Stable with an A1c of 6.3%  Lifestyle modifications discussed  Pump downloaded and reviewed with patient  No changes from his pump settings today  Patient is interested to change to the tandem and Dexcom but he is still under warranty with his current pump-I informed patient that tandem has a " [employer] program from $999-one time.  Patient states he is going to call tandem    - Lipid Profile; Future    2. Retinopathy  Stable  Followed by retinologist    3. Hypothyroidism, postsurgical  Clinically unstable  Biochemically his TSH is high normal with mildly suppressed free T4  Prescription for brand-name Synthroid 137 mcg sent to pharmacy, if it is too expensive patient will send me a message through [employer] so I can send prescription to [facility]  - levothyroxine (SYNTHROID) 137 MCG Tab; Take 1 Tablet by mouth every morning on an empty stomach.  Dispense: 90 Tablet; Refill: 4  - TSH; Future  - FREE THYROXINE; Future  - Comp Metabolic Panel; Future    4. Vitamin D deficiency  Stable-low normal   - VITAMIN D,25 HYDROXY (DEFICIENCY); Future    5. Long term (current) use of insulin (HCC)  On insulin for diabetes treatment    6. Insulin pump in place  Medtronic pump with guardian     Disposition: Make an appointment to follow-up in 3-4 months  Do blood work 2 weeks prior to next appointment    Thank you kindly for allowing me to participate in the diabetes care plan for this patient.    Pardeep Kennedy A.P.R.N.  5/15/2023    CC:   Dipak Gonsalez D.O.

## 2023-06-22 ENCOUNTER — TELEPHONE (OUTPATIENT)
Dept: ENDOCRINOLOGY | Facility: MEDICAL CENTER | Age: 48
End: 2023-06-22
Payer: COMMERCIAL

## 2023-06-22 NOTE — TELEPHONE ENCOUNTER
[employer] via [employer] UPDATE on patients Tandem Order and Dexcom G6      Hello, The patient's pump is still in warranty. We need a narrative letter signed and dated by the doctor. The narrative letter should clearly state the medical necessity of a new pump while the current pump is still in warranty and the current pump's warranty date. We cannot use the Face to Face as the narrative letter. The letter can either be attached via [employer] or faxed to [phone]. Thank you!

## 2023-09-13 ENCOUNTER — APPOINTMENT (OUTPATIENT)
Dept: ENDOCRINOLOGY | Facility: MEDICAL CENTER | Age: 48
End: 2023-09-13
Payer: COMMERCIAL

## 2023-10-26 ENCOUNTER — OFFICE VISIT (OUTPATIENT)
Dept: MEDICAL GROUP | Facility: LAB | Age: 48
End: 2023-10-26

## 2023-10-26 VITALS
HEIGHT: 68 IN | RESPIRATION RATE: 16 BRPM | DIASTOLIC BLOOD PRESSURE: 60 MMHG | BODY MASS INDEX: 26.66 KG/M2 | OXYGEN SATURATION: 96 % | TEMPERATURE: 97.7 F | SYSTOLIC BLOOD PRESSURE: 120 MMHG | WEIGHT: 175.93 LBS | HEART RATE: 76 BPM

## 2023-10-26 DIAGNOSIS — Z12.11 COLON CANCER SCREENING: ICD-10-CM

## 2023-10-26 DIAGNOSIS — E10.311: ICD-10-CM

## 2023-10-26 DIAGNOSIS — Z11.59 NEED FOR HEPATITIS C SCREENING TEST: ICD-10-CM

## 2023-10-26 LAB
HBA1C MFR BLD: 6.4 % (ref ?–5.8)
POCT INT CON NEG: NEGATIVE
POCT INT CON POS: POSITIVE

## 2023-10-26 PROCEDURE — 83036 HEMOGLOBIN GLYCOSYLATED A1C: CPT | Performed by: STUDENT IN AN ORGANIZED HEALTH CARE EDUCATION/TRAINING PROGRAM

## 2023-10-26 PROCEDURE — 3078F DIAST BP <80 MM HG: CPT | Performed by: STUDENT IN AN ORGANIZED HEALTH CARE EDUCATION/TRAINING PROGRAM

## 2023-10-26 PROCEDURE — 3074F SYST BP LT 130 MM HG: CPT | Performed by: STUDENT IN AN ORGANIZED HEALTH CARE EDUCATION/TRAINING PROGRAM

## 2023-10-26 PROCEDURE — 99396 PREV VISIT EST AGE 40-64: CPT | Performed by: STUDENT IN AN ORGANIZED HEALTH CARE EDUCATION/TRAINING PROGRAM

## 2023-10-26 ASSESSMENT — FIBROSIS 4 INDEX: FIB4 SCORE: 2.85

## 2023-10-26 ASSESSMENT — ENCOUNTER SYMPTOMS
ABDOMINAL PAIN: 0
COUGH: 0
FEVER: 0
SPUTUM PRODUCTION: 0
CHILLS: 0
VOMITING: 0
BLOOD IN STOOL: 0
PALPITATIONS: 0

## 2023-10-26 ASSESSMENT — PATIENT HEALTH QUESTIONNAIRE - PHQ9: CLINICAL INTERPRETATION OF PHQ2 SCORE: 0

## 2023-10-26 NOTE — PROGRESS NOTES
Subjective:     Subjective:     CC:   Chief Complaint   Patient presents with    Annual Exam       HPI:   Neel Casillas is a 48 y.o. male who presents for annual exam    Last Colorectal Cancer Screening: ordered   Last Tdap: 2017  Received HPV series: No  Hx STDs: No    Exercise: strenuous regular exercise, aerobic > 3 hours a week  Diet: above average      He  has a past medical history of Diabetes, Unspecified disorder of thyroid, and Unspecified essential hypertension (6/17/2015).    He has no past medical history of ASTHMA.  He  has no past surgical history on file.    Family History   Problem Relation Age of Onset    Hypertension Father      Social History     Tobacco Use    Smoking status: Never    Smokeless tobacco: Current     Types: Snuff   Vaping Use    Vaping Use: Never used   Substance Use Topics    Alcohol use: Yes     Comment: 4 per week    Drug use: Never     He  has no history on file for sexual activity.    Patient Active Problem List    Diagnosis Date Noted    History of Graves' disease 04/21/2022    Abnormal TSH 04/21/2022    Frozen shoulder 03/10/2022    Dyslipidemia due to type 1 diabetes mellitus (HCC) 12/26/2019    Essential hypertension 06/17/2015    Erectile disorder due to medical condition in male patient 12/10/2014    Controlled type 1 diabetes mellitus with ophthalmic complication (HCC) 06/09/2014    Diabetes mellitus with background retinopathy ([facility]) 06/09/2014    Insulin pump status 06/09/2014    Encounter for long-term (current) use of insulin ([facility]) 06/09/2014    Postablative hypothyroidism 06/09/2014     Current Outpatient Medications   Medication Sig Dispense Refill    levothyroxine (SYNTHROID) 137 MCG Tab Take 1 Tablet by mouth every morning on an empty stomach. 90 Tablet 4    insulin lispro 100 UNIT/ML INJ Using up to 100 units per day via insulin pump 90 mL 3    liothyronine (CYTOMEL) 5 MCG Tab Take 1 Tablet by mouth every day. 90 Tablet 0    lisinopril (PRINIVIL) 5 MG Tab  "Take 1 Tablet by mouth every day. 90 Tablet 3    sildenafil (REVATIO) 20 MG tablet Take 3-5 Tablets by mouth 1 time a day as needed (errectile). 90 Tablet 3    polymixin-trimethoprim (POLYTRIM) 42581-1.1 UNIT/ML-% Solution Place 1 Drop in both eyes every 4 hours. (Patient not taking: Reported on 10/26/2023) 10 mL 0     No current facility-administered medications for this visit.     No Known Allergies    Review of Systems   Review of Systems   Constitutional:  Negative for chills and fever.   Respiratory:  Negative for cough and sputum production.    Cardiovascular:  Negative for chest pain and palpitations.   Gastrointestinal:  Negative for abdominal pain, blood in stool and vomiting.   Musculoskeletal:  Positive for joint pain.        Objective:   /60 (BP Location: Left arm, Patient Position: Sitting, BP Cuff Size: Adult)   Pulse 76   Temp 36.5 °C (97.7 °F) (Temporal)   Resp 16   Ht 1.727 m (5' 8\")   Wt 79.8 kg (175 lb 14.8 oz)   SpO2 96%   BMI 26.75 kg/m²      Wt Readings from Last 4 Encounters:   10/26/23 79.8 kg (175 lb 14.8 oz)   05/15/23 78.6 kg (173 lb 3.2 oz)   02/15/23 78 kg (172 lb)   01/04/23 74.8 kg (165 lb)           Physical Exam:  Physical Exam  Constitutional:       Appearance: Normal appearance.   HENT:      Head: Normocephalic and atraumatic.      Right Ear: Tympanic membrane and ear canal normal.      Left Ear: Tympanic membrane and ear canal normal.      Mouth/Throat:      Mouth: Mucous membranes are moist.      Pharynx: Oropharynx is clear.   Eyes:      Extraocular Movements: Extraocular movements intact.      Pupils: Pupils are equal, round, and reactive to light.   Neck:      Thyroid: No thyromegaly.   Cardiovascular:      Rate and Rhythm: Normal rate and regular rhythm.      Heart sounds: Normal heart sounds.   Pulmonary:      Effort: Pulmonary effort is normal.      Breath sounds: Normal breath sounds.   Abdominal:      General: Abdomen is flat. Bowel sounds are normal.      " Palpations: Abdomen is soft.   Musculoskeletal:      Cervical back: Normal range of motion and neck supple.      Right lower leg: No edema.      Left lower leg: No edema.   Lymphadenopathy:      Cervical: No cervical adenopathy.   Skin:     General: Skin is warm and dry.   Neurological:      General: No focal deficit present.      Mental Status: He is alert.     Monofilament testing wnl b/l        Assessment and Plan:     Problem List Items Addressed This Visit       Controlled type 1 diabetes mellitus with ophthalmic complication (HCC)    Relevant Orders    POCT  A1C (Completed)    Diabetic Monofilament LE Exam (Completed)    Lipid Profile     Other Visit Diagnoses       Colon cancer screening        Relevant Orders    COLOGUARD (FIT DNA)    Need for hepatitis C screening test        Relevant Orders    HEP C VIRUS ANTIBODY             Health maintenance:    Labs per orders  Immunizations per orders  Age-appropriate industry guidance discussed including diet and exercise  Discussed diet and exercise     Follow-up: 1 year annual

## 2024-01-18 DIAGNOSIS — E89.0 HYPOTHYROIDISM, POSTSURGICAL: ICD-10-CM

## 2024-01-23 RX ORDER — LEVOTHYROXINE SODIUM 137 UG/1
137 TABLET ORAL
Qty: 90 TABLET | Refills: 4 | Status: SHIPPED | OUTPATIENT
Start: 2024-01-23 | End: 2024-02-15 | Stop reason: SDUPTHER

## 2024-02-15 DIAGNOSIS — E89.0 HYPOTHYROIDISM, POSTSURGICAL: ICD-10-CM

## 2024-02-15 DIAGNOSIS — N52.1 ERECTILE DISORDER DUE TO MEDICAL CONDITION IN MALE PATIENT: ICD-10-CM

## 2024-02-15 RX ORDER — SILDENAFIL CITRATE 20 MG/1
60-100 TABLET ORAL
Qty: 90 TABLET | Refills: 3 | Status: SHIPPED | OUTPATIENT
Start: 2024-02-15

## 2024-02-15 RX ORDER — LEVOTHYROXINE SODIUM 137 UG/1
137 TABLET ORAL
Qty: 90 TABLET | Refills: 2 | Status: SHIPPED | OUTPATIENT
Start: 2024-02-15

## 2024-02-15 NOTE — TELEPHONE ENCOUNTER
Received request via: Pharmacy    Was the patient seen in the last year in this department? Yes  10/26/23  Does the patient have an active prescription (recently filled or refills available) for medication(s) requested? No    Pharmacy Name: [facility]    Does the patient have half-way Plus and need 100 day supply (blood pressure, diabetes and cholesterol meds only)? Medication is not for cholesterol, blood pressure or diabetes

## 2024-03-24 DIAGNOSIS — E10.9 TYPE 1 DIABETES MELLITUS WITHOUT COMPLICATION (HCC): ICD-10-CM

## 2024-03-24 RX ORDER — LISINOPRIL 5 MG/1
5 TABLET ORAL DAILY
Qty: 90 TABLET | Refills: 0 | Status: SHIPPED | OUTPATIENT
Start: 2024-03-24

## 2024-04-12 DIAGNOSIS — Z79.4 LONG TERM (CURRENT) USE OF INSULIN (HCC): ICD-10-CM

## 2024-04-12 RX ORDER — INSULIN LISPRO 100 [IU]/ML
INJECTION, SOLUTION INTRAVENOUS; SUBCUTANEOUS
Qty: 90 ML | Refills: 3 | Status: SHIPPED | OUTPATIENT
Start: 2024-04-12

## 2024-06-13 DIAGNOSIS — E89.0 POSTABLATIVE HYPOTHYROIDISM: ICD-10-CM

## 2024-07-08 ENCOUNTER — HOSPITAL ENCOUNTER (OUTPATIENT)
Dept: LAB | Facility: MEDICAL CENTER | Age: 49
End: 2024-07-08
Attending: STUDENT IN AN ORGANIZED HEALTH CARE EDUCATION/TRAINING PROGRAM
Payer: COMMERCIAL

## 2024-07-08 DIAGNOSIS — Z11.59 NEED FOR HEPATITIS C SCREENING TEST: ICD-10-CM

## 2024-07-08 DIAGNOSIS — E89.0 POSTABLATIVE HYPOTHYROIDISM: ICD-10-CM

## 2024-07-08 DIAGNOSIS — E10.311: ICD-10-CM

## 2024-07-08 LAB
CHOLEST SERPL-MCNC: 189 MG/DL (ref 100–199)
FASTING STATUS PATIENT QL REPORTED: NORMAL
HCV AB SER QL: NORMAL
HDLC SERPL-MCNC: 79 MG/DL
LDLC SERPL CALC-MCNC: 100 MG/DL
T3FREE SERPL-MCNC: 3.07 PG/ML (ref 2–4.4)
T4 FREE SERPL-MCNC: 1.46 NG/DL (ref 0.93–1.7)
TRIGL SERPL-MCNC: 52 MG/DL (ref 0–149)
TSH SERPL DL<=0.005 MIU/L-ACNC: 0.13 UIU/ML (ref 0.38–5.33)

## 2024-07-08 PROCEDURE — 84481 FREE ASSAY (FT-3): CPT

## 2024-07-08 PROCEDURE — 84439 ASSAY OF FREE THYROXINE: CPT

## 2024-07-08 PROCEDURE — 80061 LIPID PANEL: CPT

## 2024-07-08 PROCEDURE — 86803 HEPATITIS C AB TEST: CPT

## 2024-07-08 PROCEDURE — 36415 COLL VENOUS BLD VENIPUNCTURE: CPT

## 2024-07-08 PROCEDURE — 84443 ASSAY THYROID STIM HORMONE: CPT

## 2024-07-09 DIAGNOSIS — E89.0 HYPOTHYROIDISM, POSTSURGICAL: ICD-10-CM

## 2024-07-09 DIAGNOSIS — E03.9 HYPOTHYROIDISM, UNSPECIFIED TYPE: ICD-10-CM

## 2024-07-09 DIAGNOSIS — E89.0 POSTABLATIVE HYPOTHYROIDISM: ICD-10-CM

## 2024-07-09 RX ORDER — LEVOTHYROXINE SODIUM 137 UG/1
137 TABLET ORAL
Qty: 60 TABLET | Refills: 0 | Status: SHIPPED | OUTPATIENT
Start: 2024-07-09

## 2024-07-09 RX ORDER — LIOTHYRONINE SODIUM 5 UG/1
5 TABLET ORAL DAILY
Qty: 60 TABLET | Refills: 0 | Status: SHIPPED | OUTPATIENT
Start: 2024-07-09

## 2024-07-10 DIAGNOSIS — E10.9 TYPE 1 DIABETES MELLITUS WITHOUT COMPLICATION (HCC): ICD-10-CM

## 2024-07-10 RX ORDER — LISINOPRIL 5 MG/1
5 TABLET ORAL DAILY
Qty: 90 TABLET | Refills: 3 | Status: SHIPPED | OUTPATIENT
Start: 2024-07-10

## 2024-09-03 DIAGNOSIS — E03.9 HYPOTHYROIDISM, UNSPECIFIED TYPE: ICD-10-CM

## 2024-09-03 RX ORDER — LIOTHYRONINE SODIUM 5 UG/1
5 TABLET ORAL DAILY
Qty: 60 TABLET | Refills: 0 | Status: SHIPPED | OUTPATIENT
Start: 2024-09-03

## 2024-09-03 NOTE — TELEPHONE ENCOUNTER
Received request via: Pharmacy    Was the patient seen in the last year in this department? Yes10/26/23    Does the patient have an active prescription (recently filled or refills available) for medication(s) requested? No    Pharmacy Name: [facility]    Does the patient have detention Plus and need 100-day supply? (This applies to ALL medications)

## 2024-09-20 ENCOUNTER — HOSPITAL ENCOUNTER (OUTPATIENT)
Dept: LAB | Facility: MEDICAL CENTER | Age: 49
End: 2024-09-20
Attending: PHYSICIAN ASSISTANT
Payer: COMMERCIAL

## 2024-09-20 LAB
ALBUMIN SERPL BCP-MCNC: 4.1 G/DL (ref 3.2–4.9)
ALBUMIN/GLOB SERPL: 1.3 G/DL
ALP SERPL-CCNC: 58 U/L (ref 30–99)
ALT SERPL-CCNC: 6 U/L (ref 2–50)
ANION GAP SERPL CALC-SCNC: 12 MMOL/L (ref 7–16)
AST SERPL-CCNC: 22 U/L (ref 12–45)
BASOPHILS # BLD AUTO: 0.7 % (ref 0–1.8)
BASOPHILS # BLD: 0.04 K/UL (ref 0–0.12)
BILIRUB SERPL-MCNC: 0.5 MG/DL (ref 0.1–1.5)
BUN SERPL-MCNC: 14 MG/DL (ref 8–22)
CALCIUM ALBUM COR SERPL-MCNC: 9 MG/DL (ref 8.5–10.5)
CALCIUM SERPL-MCNC: 9.1 MG/DL (ref 8.4–10.2)
CHLORIDE SERPL-SCNC: 105 MMOL/L (ref 96–112)
CHOLEST SERPL-MCNC: 174 MG/DL (ref 100–199)
CO2 SERPL-SCNC: 25 MMOL/L (ref 20–33)
CREAT SERPL-MCNC: 1.12 MG/DL (ref 0.5–1.4)
CREAT UR-MCNC: 146.78 MG/DL
EOSINOPHIL # BLD AUTO: 0.23 K/UL (ref 0–0.51)
EOSINOPHIL NFR BLD: 4 % (ref 0–6.9)
ERYTHROCYTE [DISTWIDTH] IN BLOOD BY AUTOMATED COUNT: 42.2 FL (ref 35.9–50)
FASTING STATUS PATIENT QL REPORTED: NORMAL
GFR SERPLBLD CREATININE-BSD FMLA CKD-EPI: 80 ML/MIN/1.73 M 2
GLOBULIN SER CALC-MCNC: 3.2 G/DL (ref 1.9–3.5)
GLUCOSE SERPL-MCNC: 114 MG/DL (ref 65–99)
HCT VFR BLD AUTO: 45 % (ref 42–52)
HDLC SERPL-MCNC: 87 MG/DL
HGB BLD-MCNC: 15.1 G/DL (ref 14–18)
IMM GRANULOCYTES # BLD AUTO: 0.02 K/UL (ref 0–0.11)
IMM GRANULOCYTES NFR BLD AUTO: 0.3 % (ref 0–0.9)
LDLC SERPL CALC-MCNC: 79 MG/DL
LYMPHOCYTES # BLD AUTO: 1.87 K/UL (ref 1–4.8)
LYMPHOCYTES NFR BLD: 32.2 % (ref 22–41)
MCH RBC QN AUTO: 31.5 PG (ref 27–33)
MCHC RBC AUTO-ENTMCNC: 33.6 G/DL (ref 32.3–36.5)
MCV RBC AUTO: 93.9 FL (ref 81.4–97.8)
MICROALBUMIN UR-MCNC: 1.5 MG/DL
MICROALBUMIN/CREAT UR: 10 MG/G (ref 0–30)
MONOCYTES # BLD AUTO: 0.66 K/UL (ref 0–0.85)
MONOCYTES NFR BLD AUTO: 11.4 % (ref 0–13.4)
NEUTROPHILS # BLD AUTO: 2.99 K/UL (ref 1.82–7.42)
NEUTROPHILS NFR BLD: 51.4 % (ref 44–72)
NRBC # BLD AUTO: 0 K/UL
NRBC BLD-RTO: 0 /100 WBC (ref 0–0.2)
PLATELET # BLD AUTO: 192 K/UL (ref 164–446)
PMV BLD AUTO: 9.4 FL (ref 9–12.9)
POTASSIUM SERPL-SCNC: 4.9 MMOL/L (ref 3.6–5.5)
PROT SERPL-MCNC: 7.3 G/DL (ref 6–8.2)
RBC # BLD AUTO: 4.79 M/UL (ref 4.7–6.1)
SODIUM SERPL-SCNC: 142 MMOL/L (ref 135–145)
T4 FREE SERPL-MCNC: 1.43 NG/DL (ref 0.93–1.7)
TRIGL SERPL-MCNC: 42 MG/DL (ref 0–149)
TSH SERPL DL<=0.005 MIU/L-ACNC: 0.12 UIU/ML (ref 0.38–5.33)
WBC # BLD AUTO: 5.8 K/UL (ref 4.8–10.8)

## 2024-09-20 PROCEDURE — 84443 ASSAY THYROID STIM HORMONE: CPT

## 2024-09-20 PROCEDURE — 82043 UR ALBUMIN QUANTITATIVE: CPT

## 2024-09-20 PROCEDURE — 80061 LIPID PANEL: CPT

## 2024-09-20 PROCEDURE — 36415 COLL VENOUS BLD VENIPUNCTURE: CPT

## 2024-09-20 PROCEDURE — 84439 ASSAY OF FREE THYROXINE: CPT

## 2024-09-20 PROCEDURE — 80053 COMPREHEN METABOLIC PANEL: CPT

## 2024-09-20 PROCEDURE — 85025 COMPLETE CBC W/AUTO DIFF WBC: CPT

## 2024-09-20 PROCEDURE — 82570 ASSAY OF URINE CREATININE: CPT

## 2024-10-31 DIAGNOSIS — E03.9 HYPOTHYROIDISM, UNSPECIFIED TYPE: ICD-10-CM

## 2024-10-31 RX ORDER — LIOTHYRONINE SODIUM 5 UG/1
5 TABLET ORAL DAILY
Qty: 60 TABLET | Refills: 0 | Status: SHIPPED | OUTPATIENT
Start: 2024-10-31

## 2024-12-20 ENCOUNTER — HOSPITAL ENCOUNTER (OUTPATIENT)
Dept: LAB | Facility: MEDICAL CENTER | Age: 49
End: 2024-12-20
Attending: PHYSICIAN ASSISTANT
Payer: COMMERCIAL

## 2024-12-20 LAB
T4 FREE SERPL-MCNC: 1.49 NG/DL (ref 0.93–1.7)
TSH SERPL DL<=0.005 MIU/L-ACNC: 0.14 UIU/ML (ref 0.38–5.33)

## 2024-12-20 PROCEDURE — 84443 ASSAY THYROID STIM HORMONE: CPT

## 2024-12-20 PROCEDURE — 84439 ASSAY OF FREE THYROXINE: CPT

## 2024-12-20 PROCEDURE — 36415 COLL VENOUS BLD VENIPUNCTURE: CPT

## 2025-01-03 ENCOUNTER — TELEPHONE (OUTPATIENT)
Dept: MEDICAL GROUP | Facility: LAB | Age: 50
End: 2025-01-03
Payer: COMMERCIAL

## 2025-01-03 DIAGNOSIS — E89.0 HYPOTHYROIDISM, POSTSURGICAL: ICD-10-CM

## 2025-01-03 RX ORDER — LEVOTHYROXINE SODIUM 137 UG/1
137 TABLET ORAL
Qty: 60 TABLET | Refills: 0 | Status: SHIPPED | OUTPATIENT
Start: 2025-01-03

## 2025-01-03 NOTE — TELEPHONE ENCOUNTER
levothyroxine (SYNTHROID)   [facility] needs clarification dose. Patient has 137 MCG from you and  125 MCG from another provider.

## 2025-01-03 NOTE — TELEPHONE ENCOUNTER
Received request via: Pharmacy    Was the patient seen in the last year in this department? Yes  10/26/23 appt scheduled 2/4/25  Does the patient have an active prescription (recently filled or refills available) for medication(s) requested? No    Pharmacy Name: [facility]    Does the patient have detention Plus and need 100-day supply? (This applies to ALL medications)

## 2025-01-28 ENCOUNTER — HOSPITAL ENCOUNTER (OUTPATIENT)
Facility: MEDICAL CENTER | Age: 50
End: 2025-01-28
Attending: STUDENT IN AN ORGANIZED HEALTH CARE EDUCATION/TRAINING PROGRAM
Payer: COMMERCIAL

## 2025-01-28 DIAGNOSIS — R53.83 OTHER FATIGUE: ICD-10-CM

## 2025-01-28 PROCEDURE — 86258 DGP ANTIBODY EACH IG CLASS: CPT | Mod: 91

## 2025-01-28 PROCEDURE — 36415 COLL VENOUS BLD VENIPUNCTURE: CPT

## 2025-01-28 PROCEDURE — 86364 TISS TRNSGLTMNASE EA IG CLAS: CPT | Mod: 91

## 2025-01-30 LAB
GLIADIN IGA SER IA-ACNC: <0.72 FLU (ref 0–4.99)
TTG IGA SER IA-ACNC: <1.02 FLU (ref 0–4.99)

## 2025-01-31 LAB
GLIADIN IGG SER IA-ACNC: <0.56 FLU (ref 0–4.99)
TTG IGG SER IA-ACNC: <0.82 FLU (ref 0–4.99)

## 2025-02-05 ENCOUNTER — TELEPHONE (OUTPATIENT)
Dept: HEALTH INFORMATION MANAGEMENT | Facility: OTHER | Age: 50
End: 2025-02-05
Payer: COMMERCIAL

## 2025-02-24 ENCOUNTER — APPOINTMENT (OUTPATIENT)
Dept: MEDICAL GROUP | Facility: LAB | Age: 50
End: 2025-02-24
Payer: COMMERCIAL

## 2025-02-24 VITALS
WEIGHT: 175 LBS | HEIGHT: 69 IN | BODY MASS INDEX: 25.92 KG/M2 | TEMPERATURE: 97.4 F | OXYGEN SATURATION: 96 % | SYSTOLIC BLOOD PRESSURE: 118 MMHG | RESPIRATION RATE: 18 BRPM | DIASTOLIC BLOOD PRESSURE: 58 MMHG | HEART RATE: 80 BPM

## 2025-02-24 DIAGNOSIS — Z12.11 COLON CANCER SCREENING: ICD-10-CM

## 2025-02-24 DIAGNOSIS — E89.0 HYPOTHYROIDISM, POSTSURGICAL: ICD-10-CM

## 2025-02-24 DIAGNOSIS — E11.3299 DIABETES MELLITUS WITH BACKGROUND RETINOPATHY (HCC): ICD-10-CM

## 2025-02-24 DIAGNOSIS — E10.69 DYSLIPIDEMIA DUE TO TYPE 1 DIABETES MELLITUS (HCC): ICD-10-CM

## 2025-02-24 DIAGNOSIS — E10.319 DIABETIC RETINOPATHY OF BOTH EYES ASSOCIATED WITH TYPE 1 DIABETES MELLITUS, MACULAR EDEMA PRESENCE UNSPECIFIED, UNSPECIFIED RETINOPATHY SEVERITY (HCC): ICD-10-CM

## 2025-02-24 DIAGNOSIS — Z79.4 ENCOUNTER FOR LONG-TERM (CURRENT) USE OF INSULIN (HCC): ICD-10-CM

## 2025-02-24 DIAGNOSIS — E03.9 HYPOTHYROIDISM, UNSPECIFIED TYPE: ICD-10-CM

## 2025-02-24 DIAGNOSIS — E78.5 DYSLIPIDEMIA DUE TO TYPE 1 DIABETES MELLITUS (HCC): ICD-10-CM

## 2025-02-24 PROCEDURE — 3074F SYST BP LT 130 MM HG: CPT | Performed by: STUDENT IN AN ORGANIZED HEALTH CARE EDUCATION/TRAINING PROGRAM

## 2025-02-24 PROCEDURE — 3078F DIAST BP <80 MM HG: CPT | Performed by: STUDENT IN AN ORGANIZED HEALTH CARE EDUCATION/TRAINING PROGRAM

## 2025-02-24 PROCEDURE — 99396 PREV VISIT EST AGE 40-64: CPT | Performed by: STUDENT IN AN ORGANIZED HEALTH CARE EDUCATION/TRAINING PROGRAM

## 2025-02-24 RX ORDER — LEVOTHYROXINE SODIUM 137 UG/1
137 TABLET ORAL
Qty: 90 TABLET | Refills: 0 | Status: SHIPPED | OUTPATIENT
Start: 2025-02-24

## 2025-02-24 ASSESSMENT — ENCOUNTER SYMPTOMS
CHILLS: 0
SHORTNESS OF BREATH: 0
FEVER: 0

## 2025-02-24 ASSESSMENT — FIBROSIS 4 INDEX: FIB4 SCORE: 2.29

## 2025-02-24 ASSESSMENT — PATIENT HEALTH QUESTIONNAIRE - PHQ9: CLINICAL INTERPRETATION OF PHQ2 SCORE: 0

## 2025-02-24 NOTE — PROGRESS NOTES
Subjective:     Subjective:     CC:   Chief Complaint   Patient presents with    Annual Exam       HPI:   Neel Casillas is a 49 y.o. male who presents for annual exam       Last Colorectal Cancer Screening: ordered previously   Last Tdap: 2017  Received HPV series: No  Hx STDs: No     Exercise: strenuous regular exercise, aerobic > 3 hours a week  Diet: above average     He  has a past medical history of Diabetes, Unspecified disorder of thyroid, and Unspecified essential hypertension (6/17/2015).    He has no past medical history of ASTHMA.  He  has no past surgical history on file.    Family History   Problem Relation Age of Onset    Hypertension Father      Social History     Tobacco Use    Smoking status: Never    Smokeless tobacco: Current     Types: Snuff   Vaping Use    Vaping status: Never Used   Substance Use Topics    Alcohol use: Yes     Comment: 4 per week    Drug use: Never     He  has no history on file for sexual activity.    Patient Active Problem List    Diagnosis Date Noted    Diabetic retinopathy of both eyes associated with type 1 diabetes mellitus (HCC) 02/24/2025    History of Graves' disease 04/21/2022    Abnormal TSH 04/21/2022    Frozen shoulder 03/10/2022    Dyslipidemia due to type 1 diabetes mellitus (HCC) 12/26/2019    Essential hypertension 06/17/2015    Erectile disorder due to medical condition in male patient 12/10/2014    Controlled type 1 diabetes mellitus with ophthalmic complication (HCC) 06/09/2014    Diabetes mellitus with background retinopathy (HCC) 06/09/2014    Insulin pump status 06/09/2014    Encounter for long-term (current) use of insulin (HCC) 06/09/2014    Postablative hypothyroidism 06/09/2014     Current Outpatient Medications   Medication Sig Dispense Refill    levothyroxine (SYNTHROID) 137 MCG Tab Take 1 Tablet by mouth every morning on an empty stomach. 90 Tablet 0    liothyronine (CYTOMEL) 5 MCG Tab TAKE 1 TABLET BY MOUTH DAILY 60 Tablet 0    lisinopril  "(PRINIVIL) 5 MG Tab Take 1 Tablet by mouth every day. Please make an appointment for any future refills 90 Tablet 3    insulin lispro (HUMALOG) 100 UNIT/ML INJ USE UP  UNITS PER DAY VIA INSULIN PUMP *INSULIN LISPRO*. 90 mL 3    sildenafil (REVATIO) 20 MG tablet Take 3-5 Tablets by mouth 1 time a day as needed (errectile). 90 Tablet 3    polymixin-trimethoprim (POLYTRIM) 93406-7.1 UNIT/ML-% Solution Place 1 Drop in both eyes every 4 hours. (Patient not taking: Reported on 10/26/2023) 10 mL 0     No current facility-administered medications for this visit.     No Known Allergies    Review of Systems   Review of Systems   Constitutional:  Negative for chills and fever.   Respiratory:  Negative for shortness of breath.    Cardiovascular:  Negative for chest pain.        Objective:   /58 (BP Location: Right arm, Patient Position: Sitting, BP Cuff Size: Adult)   Pulse 80   Temp 36.3 °C (97.4 °F) (Temporal)   Resp 18   Ht 1.753 m (5' 9\")   Wt 79.4 kg (175 lb)   SpO2 96%   BMI 25.84 kg/m²      Wt Readings from Last 4 Encounters:   02/24/25 79.4 kg (175 lb)   10/26/23 79.8 kg (175 lb 14.8 oz)   05/15/23 78.6 kg (173 lb 3.2 oz)   02/15/23 78 kg (172 lb)           Physical Exam:  Physical Exam        Assessment and Plan:     Problem List Items Addressed This Visit       Diabetes mellitus with background retinopathy (HCC)    Diabetic retinopathy of both eyes associated with type 1 diabetes mellitus (HCC)    Dyslipidemia due to type 1 diabetes mellitus (HCC)    Encounter for long-term (current) use of insulin (HCC)     Other Visit Diagnoses         Colon cancer screening        Relevant Orders    Cologuard® colon cancer screening      Hypothyroidism, postsurgical        Relevant Medications    levothyroxine (SYNTHROID) 137 MCG Tab      Hypothyroidism, unspecified type        Relevant Medications    levothyroxine (SYNTHROID) 137 MCG Tab             Health maintenance:    Labs per orders  Immunizations per " orders  Age-appropriate guidance discussed including diet and exercise  Discussed colorectal cancer screening     Follow-up: 1 year annual

## 2025-03-07 ENCOUNTER — PATIENT MESSAGE (OUTPATIENT)
Dept: MEDICAL GROUP | Facility: LAB | Age: 50
End: 2025-03-07
Payer: COMMERCIAL

## 2025-03-07 DIAGNOSIS — E10.9 TYPE 1 DIABETES MELLITUS WITHOUT COMPLICATION (HCC): ICD-10-CM

## 2025-03-18 ENCOUNTER — HOSPITAL ENCOUNTER (OUTPATIENT)
Facility: MEDICAL CENTER | Age: 50
End: 2025-03-18
Attending: PHYSICIAN ASSISTANT
Payer: COMMERCIAL

## 2025-03-18 LAB
25(OH)D3 SERPL-MCNC: 22 NG/ML (ref 30–100)
ALBUMIN SERPL BCP-MCNC: 4 G/DL (ref 3.2–4.9)
ALBUMIN/GLOB SERPL: 1.5 G/DL
ALP SERPL-CCNC: 50 U/L (ref 30–99)
ALT SERPL-CCNC: <5 U/L (ref 2–50)
ANION GAP SERPL CALC-SCNC: 9 MMOL/L (ref 7–16)
AST SERPL-CCNC: 20 U/L (ref 12–45)
BASOPHILS # BLD AUTO: 0.6 % (ref 0–1.8)
BASOPHILS # BLD: 0.04 K/UL (ref 0–0.12)
BILIRUB SERPL-MCNC: 0.5 MG/DL (ref 0.1–1.5)
BUN SERPL-MCNC: 19 MG/DL (ref 8–22)
CALCIUM ALBUM COR SERPL-MCNC: 8.8 MG/DL (ref 8.5–10.5)
CALCIUM SERPL-MCNC: 8.8 MG/DL (ref 8.4–10.2)
CHLORIDE SERPL-SCNC: 109 MMOL/L (ref 96–112)
CO2 SERPL-SCNC: 23 MMOL/L (ref 20–33)
CREAT SERPL-MCNC: 0.97 MG/DL (ref 0.5–1.4)
EOSINOPHIL # BLD AUTO: 0.44 K/UL (ref 0–0.51)
EOSINOPHIL NFR BLD: 7 % (ref 0–6.9)
ERYTHROCYTE [DISTWIDTH] IN BLOOD BY AUTOMATED COUNT: 41.8 FL (ref 35.9–50)
FASTING STATUS PATIENT QL REPORTED: NORMAL
GFR SERPLBLD CREATININE-BSD FMLA CKD-EPI: 95 ML/MIN/1.73 M 2
GLOBULIN SER CALC-MCNC: 2.7 G/DL (ref 1.9–3.5)
GLUCOSE SERPL-MCNC: 101 MG/DL (ref 65–99)
HCT VFR BLD AUTO: 46.3 % (ref 42–52)
HGB BLD-MCNC: 15.3 G/DL (ref 14–18)
IMM GRANULOCYTES # BLD AUTO: 0.01 K/UL (ref 0–0.11)
IMM GRANULOCYTES NFR BLD AUTO: 0.2 % (ref 0–0.9)
LYMPHOCYTES # BLD AUTO: 2.06 K/UL (ref 1–4.8)
LYMPHOCYTES NFR BLD: 32.9 % (ref 22–41)
MCH RBC QN AUTO: 31.1 PG (ref 27–33)
MCHC RBC AUTO-ENTMCNC: 33 G/DL (ref 32.3–36.5)
MCV RBC AUTO: 94.1 FL (ref 81.4–97.8)
MONOCYTES # BLD AUTO: 0.59 K/UL (ref 0–0.85)
MONOCYTES NFR BLD AUTO: 9.4 % (ref 0–13.4)
NEUTROPHILS # BLD AUTO: 3.13 K/UL (ref 1.82–7.42)
NEUTROPHILS NFR BLD: 49.9 % (ref 44–72)
NRBC # BLD AUTO: 0 K/UL
NRBC BLD-RTO: 0 /100 WBC (ref 0–0.2)
PLATELET # BLD AUTO: 185 K/UL (ref 164–446)
PMV BLD AUTO: 9.8 FL (ref 9–12.9)
POTASSIUM SERPL-SCNC: 4.3 MMOL/L (ref 3.6–5.5)
PROT SERPL-MCNC: 6.7 G/DL (ref 6–8.2)
RBC # BLD AUTO: 4.92 M/UL (ref 4.7–6.1)
SODIUM SERPL-SCNC: 141 MMOL/L (ref 135–145)
VIT B12 SERPL-MCNC: 712 PG/ML (ref 211–911)
WBC # BLD AUTO: 6.3 K/UL (ref 4.8–10.8)

## 2025-03-18 PROCEDURE — 82607 VITAMIN B-12: CPT

## 2025-03-18 PROCEDURE — 36415 COLL VENOUS BLD VENIPUNCTURE: CPT

## 2025-03-18 PROCEDURE — 82746 ASSAY OF FOLIC ACID SERUM: CPT

## 2025-03-18 PROCEDURE — 84270 ASSAY OF SEX HORMONE GLOBUL: CPT

## 2025-03-18 PROCEDURE — 82157 ASSAY OF ANDROSTENEDIONE: CPT

## 2025-03-18 PROCEDURE — 84403 ASSAY OF TOTAL TESTOSTERONE: CPT

## 2025-03-18 PROCEDURE — 85025 COMPLETE CBC W/AUTO DIFF WBC: CPT

## 2025-03-18 PROCEDURE — 80053 COMPREHEN METABOLIC PANEL: CPT

## 2025-03-18 PROCEDURE — 84402 ASSAY OF FREE TESTOSTERONE: CPT

## 2025-03-18 PROCEDURE — 82306 VITAMIN D 25 HYDROXY: CPT

## 2025-03-19 ENCOUNTER — PATIENT MESSAGE (OUTPATIENT)
Dept: MEDICAL GROUP | Facility: LAB | Age: 50
End: 2025-03-19
Payer: COMMERCIAL

## 2025-03-19 DIAGNOSIS — H26.9 CATARACT, UNSPECIFIED CATARACT TYPE, UNSPECIFIED LATERALITY: ICD-10-CM

## 2025-03-19 LAB — FOLATE SERPL-MCNC: 12.1 NG/ML

## 2025-03-20 LAB
SHBG SERPL-SCNC: 77 NMOL/L (ref 17–56)
TESTOST FREE MFR SERPL: 1.2 % (ref 1.6–2.9)
TESTOST FREE SERPL-MCNC: 122 PG/ML (ref 47–244)
TESTOST SERPL-MCNC: 1001 NG/DL (ref 300–890)

## 2025-03-22 LAB — ANDROST SERPL-MCNC: 0.75 NG/ML (ref 0.23–0.89)

## 2025-03-30 LAB — NONINV COLON CA DNA+OCC BLD SCRN STL QL: NEGATIVE

## 2025-03-31 ENCOUNTER — RESULTS FOLLOW-UP (OUTPATIENT)
Dept: MEDICAL GROUP | Facility: LAB | Age: 50
End: 2025-03-31

## 2025-06-17 DIAGNOSIS — E10.9 TYPE 1 DIABETES MELLITUS WITHOUT COMPLICATION (HCC): Primary | ICD-10-CM

## 2025-07-02 DIAGNOSIS — E10.9 TYPE 1 DIABETES MELLITUS WITHOUT COMPLICATION (HCC): ICD-10-CM

## 2025-07-02 RX ORDER — LISINOPRIL 5 MG/1
TABLET ORAL
Qty: 90 TABLET | Refills: 3 | Status: SHIPPED | OUTPATIENT
Start: 2025-07-02